# Patient Record
Sex: MALE | Race: WHITE | NOT HISPANIC OR LATINO | Employment: FULL TIME | URBAN - NONMETROPOLITAN AREA
[De-identification: names, ages, dates, MRNs, and addresses within clinical notes are randomized per-mention and may not be internally consistent; named-entity substitution may affect disease eponyms.]

---

## 2018-01-19 ENCOUNTER — NON-PROVIDER VISIT (OUTPATIENT)
Dept: URGENT CARE | Facility: PHYSICIAN GROUP | Age: 58
End: 2018-01-19

## 2018-01-19 PROCEDURE — 80305 DRUG TEST PRSMV DIR OPT OBS: CPT | Performed by: PHYSICIAN ASSISTANT

## 2018-02-09 ENCOUNTER — OFFICE VISIT (OUTPATIENT)
Dept: MEDICAL GROUP | Facility: PHYSICIAN GROUP | Age: 58
End: 2018-02-09
Payer: COMMERCIAL

## 2018-02-09 VITALS
HEIGHT: 67 IN | HEART RATE: 94 BPM | BODY MASS INDEX: 41.59 KG/M2 | WEIGHT: 265 LBS | DIASTOLIC BLOOD PRESSURE: 86 MMHG | TEMPERATURE: 98.9 F | OXYGEN SATURATION: 98 % | SYSTOLIC BLOOD PRESSURE: 134 MMHG | RESPIRATION RATE: 20 BRPM

## 2018-02-09 DIAGNOSIS — Z00.00 ENCOUNTER FOR PREVENTATIVE ADULT HEALTH CARE EXAMINATION: ICD-10-CM

## 2018-02-09 DIAGNOSIS — Z13.6 SCREENING FOR CARDIOVASCULAR CONDITION: ICD-10-CM

## 2018-02-09 DIAGNOSIS — R53.83 FATIGUE, UNSPECIFIED TYPE: ICD-10-CM

## 2018-02-09 DIAGNOSIS — Z12.11 SCREEN FOR COLON CANCER: ICD-10-CM

## 2018-02-09 DIAGNOSIS — Z13.21 ENCOUNTER FOR VITAMIN DEFICIENCY SCREENING: ICD-10-CM

## 2018-02-09 DIAGNOSIS — Z13.29 SCREENING FOR THYROID DISORDER: ICD-10-CM

## 2018-02-09 DIAGNOSIS — E78.2 MIXED HYPERLIPIDEMIA: ICD-10-CM

## 2018-02-09 PROCEDURE — 99203 OFFICE O/P NEW LOW 30 MIN: CPT | Performed by: NURSE PRACTITIONER

## 2018-02-09 ASSESSMENT — PATIENT HEALTH QUESTIONNAIRE - PHQ9: CLINICAL INTERPRETATION OF PHQ2 SCORE: 0

## 2018-02-09 NOTE — ASSESSMENT & PLAN NOTE
This is a chronic problem for patient. His last lipid profile in 2014 showed LDL at 155, triglycerides at 206. At that time patient was going to work on diet and exercise. He reports he had lost 20 pounds, but then gained it back.  He would like to get back on track and take control of his health.  I commended on wanting to get healthy.  We reviewed low cholesterol diet an education handout was given to patient.  Will get updated lipid profile.

## 2018-02-10 NOTE — PATIENT INSTRUCTIONS
Fat and Cholesterol Restricted Diet  High levels of fat and cholesterol in your blood may lead to various health problems, such as diseases of the heart, blood vessels, gallbladder, liver, and pancreas. Fats are concentrated sources of energy that come in various forms. Certain types of fat, including saturated fat, may be harmful in excess. Cholesterol is a substance needed by your body in small amounts. Your body makes all the cholesterol it needs. Excess cholesterol comes from the food you eat.  When you have high levels of cholesterol and saturated fat in your blood, health problems can develop because the excess fat and cholesterol will gather along the walls of your blood vessels, causing them to narrow. Choosing the right foods will help you control your intake of fat and cholesterol. This will help keep the levels of these substances in your blood within normal limits and reduce your risk of disease.  WHAT IS MY PLAN?  Your health care provider recommends that you:  · Get no more than __________ % of the total calories in your daily diet from fat.  · Limit your intake of saturated fat to less than ______% of your total calories each day.  · Limit the amount of cholesterol in your diet to less than _________mg per day.  WHAT TYPES OF FAT SHOULD I CHOOSE?  · Choose healthy fats more often. Choose monounsaturated and polyunsaturated fats, such as olive and canola oil, flaxseeds, walnuts, almonds, and seeds.  · Eat more omega-3 fats. Good choices include salmon, mackerel, sardines, tuna, flaxseed oil, and ground flaxseeds. Aim to eat fish at least two times a week.  · Limit saturated fats. Saturated fats are primarily found in animal products, such as meats, butter, and cream. Plant sources of saturated fats include palm oil, palm kernel oil, and coconut oil.  · Avoid foods with partially hydrogenated oils in them. These contain trans fats. Examples of foods that contain trans fats are stick margarine, some tub  "margarines, cookies, crackers, and other baked goods.  WHAT GENERAL GUIDELINES DO I NEED TO FOLLOW?  These guidelines for healthy eating will help you control your intake of fat and cholesterol:  · Check food labels carefully to identify foods with trans fats or high amounts of saturated fat.  · Fill one half of your plate with vegetables and green salads.  · Fill one fourth of your plate with whole grains. Look for the word \"whole\" as the first word in the ingredient list.  · Fill one fourth of your plate with lean protein foods.  · Limit fruit to two servings a day. Choose fruit instead of juice.  · Eat more foods that contain soluble fiber. Examples of foods that contain this type of fiber are apples, broccoli, carrots, beans, peas, and barley. Aim to get 20-30 g of fiber per day.  · Eat more home-cooked food and less restaurant, buffet, and fast food.  · Limit or avoid alcohol.  · Limit foods high in starch and sugar.  · Limit fried foods.  · Cook foods using methods other than frying. Baking, boiling, grilling, and broiling are all great options.  · Lose weight if you are overweight. Losing just 5-10% of your initial body weight can help your overall health and prevent diseases such as diabetes and heart disease.  WHAT FOODS CAN I EAT?  Grains  Whole grains, such as whole wheat or whole grain breads, crackers, cereals, and pasta. Unsweetened oatmeal, bulgur, barley, quinoa, or brown rice. Corn or whole wheat flour tortillas.  Vegetables  Fresh or frozen vegetables (raw, steamed, roasted, or grilled). Green salads.  Fruits  All fresh, canned (in natural juice), or frozen fruits.  Meat and Other Protein Products  Ground beef (85% or leaner), grass-fed beef, or beef trimmed of fat. Skinless chicken or turkey. Ground chicken or turkey. Pork trimmed of fat. All fish and seafood. Eggs. Dried beans, peas, or lentils. Unsalted nuts or seeds. Unsalted canned or dry beans.  Dairy  Low-fat dairy products, such as skim or " 1% milk, 2% or reduced-fat cheeses, low-fat ricotta or cottage cheese, or plain low-fat yogurt.  Fats and Oils  Tub margarines without trans fats. Light or reduced-fat mayonnaise and salad dressings. Avocado. Olive, canola, sesame, or safflower oils. Natural peanut or almond butter (choose ones without added sugar and oil).  The items listed above may not be a complete list of recommended foods or beverages. Contact your dietitian for more options.  WHAT FOODS ARE NOT RECOMMENDED?  Grains  White bread. White pasta. White rice. Cornbread. Bagels, pastries, and croissants. Crackers that contain trans fat.  Vegetables  White potatoes. Corn. Creamed or fried vegetables. Vegetables in a cheese sauce.  Fruits  Dried fruits. Canned fruit in light or heavy syrup. Fruit juice.  Meat and Other Protein Products  Fatty cuts of meat. Ribs, chicken wings, vo, sausage, bologna, salami, chitterlings, fatback, hot dogs, bratwurst, and packaged luncheon meats. Liver and organ meats.  Dairy  Whole or 2% milk, cream, half-and-half, and cream cheese. Whole milk cheeses. Whole-fat or sweetened yogurt. Full-fat cheeses. Nondairy creamers and whipped toppings. Processed cheese, cheese spreads, or cheese curds.  Sweets and Desserts  Corn syrup, sugars, honey, and molasses. Candy. Jam and jelly. Syrup. Sweetened cereals. Cookies, pies, cakes, donuts, muffins, and ice cream.  Fats and Oils  Butter, stick margarine, lard, shortening, ghee, or vo fat. Coconut, palm kernel, or palm oils.  Beverages  Alcohol. Sweetened drinks (such as sodas, lemonade, and fruit drinks or punches).  The items listed above may not be a complete list of foods and beverages to avoid. Contact your dietitian for more information.     This information is not intended to replace advice given to you by your health care provider. Make sure you discuss any questions you have with your health care provider.     Document Released: 12/18/2006 Document Revised: 01/08/2016  Document Reviewed: 03/18/2015  [employer] Patient Education ©2016 [employer]

## 2018-02-10 NOTE — PROGRESS NOTES
CC:  To establish care and hyperlipidemia     HISTORY OF THE PRESENT ILLNESS: Patient is a 57 y.o. male. This pleasant patient is here today to establish care and for hyperlipidemia.    Health Maintenance:  Screening colonoscopy ordered today.      Hyperlipidemia  This is a chronic problem for patient. His last lipid profile in 2014 showed LDL at 155, triglycerides at 206. At that time patient was going to work on diet and exercise. He reports he had lost 20 pounds, but then gained it back.  He would like to get back on track and take control of his health.  I commended on wanting to get healthy.  We reviewed low cholesterol diet an education handout was given to patient.  Will get updated lipid profile.      Allergies: Patient has no known allergies.    Current Outpatient Prescriptions Ordered in [facility]   Medication Sig Dispense Refill   • oxycodone-acetaminophen (PERCOCET) 5-325 MG TABS Take 1-2 Tabs by mouth every four hours as needed. Indications: Moderate to Moderately Severe Pain     • metoclopramide (REGLAN) 10 MG TABS Take 10 mg by mouth 4 times a day. 1 tablet before meals and at bedtime       No current Epic-ordered facility-administered medications on file.        History reviewed. No pertinent past medical history.    Past Surgical History:   Procedure Laterality Date   • EXPLORATORY LAPAROTOMY  1/6/2014    Performed by Elmer Bojorquez M.D. at SURGERY [facility]       Social History   Substance Use Topics   • Smoking status: Former Smoker     Packs/day: 0.50     Years: 10.00     Types: Cigarettes     Quit date: 2/4/1999   • Smokeless tobacco: Never Used   • Alcohol use 1.2 oz/week     2 Cans of beer per week      Comment: occ       Family History   Problem Relation Age of Onset   • Cancer Mother      ovarian   • Cancer Father    • Heart Disease Paternal Grandfather    • Heart Attack Paternal Grandfather    • Diabetes Neg Hx        ROS:     - Constitutional: Negative for fever, chills,  "unexpected weight change.  Positive for fatigue.     - HEENT: Negative for vision changes, hearing changes, ear pain, rhinorrhea, sinus congestion, and sore throat.  Positive for headache when mildly dehydrated.    - Respiratory: Negative for cough, dyspnea.      - Cardiovascular: Negative for chest pain, palpitations, orthopnea, and bilateral lower extremity edema.     - Gastrointestinal: Negative for heartburn, nausea, vomiting, abdominal pain, hematochezia, melena, diarrhea, constipation.     - Genitourinary: Negative for dysuria, polyuria, hematuria, pyuria, urinary urgency, and urinary incontinence.     - Skin: Negative for rash, itching, cyanotic skin color change.     - Neurological: Negative for dizziness.           Exam: Blood pressure 134/86, pulse 94, temperature 37.2 °C (98.9 °F), resp. rate 20, height 1.702 m (5' 7\"), weight 120.2 kg (265 lb), SpO2 98 %. Body mass index is 41.5 kg/m².    General: Alert, pleasant, obese habitus, well developed male in NAD  HEENT: Normocephalic. Eyes conjunctiva clear lids without ptosis, pupils equal and reactive to light, ears normal shape and contour, Right canal with large amount of dark brown cerumen, Left canal is clear, Left tympanic membranes are pearly gray with good light reflex, unable to visualize right TM, nasal mucosa without erythema and drainage, oropharynx is without erythema, edema or exudates.   Neck: Supple without bruit. Thyroid is not enlarged.  Pulmonary: Clear to ausculation.  Normal effort. No rales, ronchi, or wheezing.  Cardiovascular: Regular rate and rhythm without murmur. Carotid and radial pulses are intact and equal bilaterally. No lower extremity edmema.  Abdomen: Mildly firm, nontender, distended. Normal bowel sounds. Unable to appreciate liver and spleen due to body habitus.  Neurologic: Grossly nonfocal  Lymph: No cervical or supraclavicular lymph nodes are palpable  Skin: Warm and dry.  No obvious lesions.  Musculoskeletal: Normal " gait.  Psych: Normal mood and affect. Alert and oriented. Judgment and insight is normal.    Please note that this dictation was created using voice recognition software. I have made every reasonable attempt to correct obvious errors, but I expect that there are errors of grammar and possibly content that I did not discover before finalizing the note.      Assessment/Plan  1. Mixed hyperlipidemia  Will review lab results at follow up appointment.  - LIPID PROFILE; Future    2. Screening for cardiovascular condition  Will review lab results with patient at follow up appointment.  - COMP METABOLIC PANEL; Future  - LIPID PROFILE; Future  - ESTIMATED GFR; Future    3. Encounter for vitamin deficiency screening  Will review at appointment  - VITAMIN D,25 HYDROXY; Future    4. Screening for thyroid disorder    - TSH; Future  - FREE THYROXINE; Future    5. Fatigue, unspecified type  Will review at appointment.  - TESTOSTERONE LC-MS/MX BIO/SHBG; Future    6. Screen for colon cancer  Ordered.  - REFERRAL TO GI FOR COLONOSCOPY     Patient will return to clinic in one month to review lab results and for management of hyperlipidemia.

## 2018-02-26 ENCOUNTER — HOSPITAL ENCOUNTER (OUTPATIENT)
Dept: LAB | Facility: MEDICAL CENTER | Age: 58
End: 2018-02-26
Attending: NURSE PRACTITIONER
Payer: COMMERCIAL

## 2018-02-26 DIAGNOSIS — R53.83 FATIGUE, UNSPECIFIED TYPE: ICD-10-CM

## 2018-02-26 DIAGNOSIS — Z13.6 SCREENING FOR CARDIOVASCULAR CONDITION: ICD-10-CM

## 2018-02-26 DIAGNOSIS — Z13.21 ENCOUNTER FOR VITAMIN DEFICIENCY SCREENING: ICD-10-CM

## 2018-02-26 DIAGNOSIS — Z13.29 SCREENING FOR THYROID DISORDER: ICD-10-CM

## 2018-02-26 DIAGNOSIS — E78.2 MIXED HYPERLIPIDEMIA: ICD-10-CM

## 2018-02-26 LAB
ALBUMIN SERPL BCP-MCNC: 4.3 G/DL (ref 3.2–4.9)
ALBUMIN/GLOB SERPL: 1.4 G/DL
ALP SERPL-CCNC: 61 U/L (ref 30–99)
ALT SERPL-CCNC: 33 U/L (ref 2–50)
ANION GAP SERPL CALC-SCNC: 3 MMOL/L (ref 0–11.9)
AST SERPL-CCNC: 22 U/L (ref 12–45)
BILIRUB SERPL-MCNC: 0.4 MG/DL (ref 0.1–1.5)
BUN SERPL-MCNC: 19 MG/DL (ref 8–22)
CALCIUM SERPL-MCNC: 9 MG/DL (ref 8.5–10.5)
CHLORIDE SERPL-SCNC: 105 MMOL/L (ref 96–112)
CHOLEST SERPL-MCNC: 225 MG/DL (ref 100–199)
CO2 SERPL-SCNC: 27 MMOL/L (ref 20–33)
CREAT SERPL-MCNC: 0.96 MG/DL (ref 0.5–1.4)
GLOBULIN SER CALC-MCNC: 3 G/DL (ref 1.9–3.5)
GLUCOSE SERPL-MCNC: 152 MG/DL (ref 65–99)
HDLC SERPL-MCNC: 31 MG/DL
LDLC SERPL CALC-MCNC: 145 MG/DL
POTASSIUM SERPL-SCNC: 4.4 MMOL/L (ref 3.6–5.5)
PROT SERPL-MCNC: 7.3 G/DL (ref 6–8.2)
SODIUM SERPL-SCNC: 135 MMOL/L (ref 135–145)
TRIGL SERPL-MCNC: 245 MG/DL (ref 0–149)

## 2018-02-26 PROCEDURE — 84403 ASSAY OF TOTAL TESTOSTERONE: CPT

## 2018-02-26 PROCEDURE — 84270 ASSAY OF SEX HORMONE GLOBUL: CPT

## 2018-02-26 PROCEDURE — 80053 COMPREHEN METABOLIC PANEL: CPT

## 2018-02-26 PROCEDURE — 80061 LIPID PANEL: CPT

## 2018-02-26 PROCEDURE — 84439 ASSAY OF FREE THYROXINE: CPT

## 2018-02-26 PROCEDURE — 36415 COLL VENOUS BLD VENIPUNCTURE: CPT

## 2018-02-26 PROCEDURE — 82306 VITAMIN D 25 HYDROXY: CPT

## 2018-02-26 PROCEDURE — 84443 ASSAY THYROID STIM HORMONE: CPT

## 2018-02-27 LAB
25(OH)D3 SERPL-MCNC: 9 NG/ML (ref 30–100)
T4 FREE SERPL-MCNC: 0.71 NG/DL (ref 0.53–1.43)
TSH SERPL DL<=0.005 MIU/L-ACNC: 1.01 UIU/ML (ref 0.38–5.33)

## 2018-03-02 LAB
SHBG SERPL-SCNC: 16 NMOL/L (ref 11–80)
TESTOST FREE SERPL-MCNC: 70.5 PG/ML (ref 47–244)
TESTOST SERPL-MCNC: 279 NG/DL (ref 300–890)
TESTOSTERONE.FREE+WB SERPL-MCNC: 188.3 NG/DL (ref 130–680)

## 2018-03-09 ENCOUNTER — OFFICE VISIT (OUTPATIENT)
Dept: MEDICAL GROUP | Facility: PHYSICIAN GROUP | Age: 58
End: 2018-03-09
Payer: COMMERCIAL

## 2018-03-09 VITALS
TEMPERATURE: 99.7 F | HEIGHT: 67 IN | RESPIRATION RATE: 20 BRPM | SYSTOLIC BLOOD PRESSURE: 138 MMHG | DIASTOLIC BLOOD PRESSURE: 88 MMHG | HEART RATE: 84 BPM | BODY MASS INDEX: 41 KG/M2 | OXYGEN SATURATION: 96 % | WEIGHT: 261.2 LBS

## 2018-03-09 DIAGNOSIS — R03.0 PREHYPERTENSION: ICD-10-CM

## 2018-03-09 DIAGNOSIS — R79.89 LOW TESTOSTERONE IN MALE: ICD-10-CM

## 2018-03-09 DIAGNOSIS — Z13.6 SCREENING FOR CARDIOVASCULAR CONDITION: ICD-10-CM

## 2018-03-09 DIAGNOSIS — E78.2 MIXED HYPERLIPIDEMIA: ICD-10-CM

## 2018-03-09 DIAGNOSIS — E55.9 VITAMIN D DEFICIENCY: ICD-10-CM

## 2018-03-09 DIAGNOSIS — R73.9 ELEVATED BLOOD SUGAR: ICD-10-CM

## 2018-03-09 PROCEDURE — 99214 OFFICE O/P EST MOD 30 MIN: CPT | Performed by: NURSE PRACTITIONER

## 2018-03-09 RX ORDER — ERGOCALCIFEROL 1.25 MG/1
50000 CAPSULE ORAL
Qty: 12 CAP | Refills: 2 | Status: SHIPPED | OUTPATIENT
Start: 2018-03-09 | End: 2019-04-26

## 2018-03-09 NOTE — PATIENT INSTRUCTIONS
Fat and Cholesterol Restricted Diet  High levels of fat and cholesterol in your blood may lead to various health problems, such as diseases of the heart, blood vessels, gallbladder, liver, and pancreas. Fats are concentrated sources of energy that come in various forms. Certain types of fat, including saturated fat, may be harmful in excess. Cholesterol is a substance needed by your body in small amounts. Your body makes all the cholesterol it needs. Excess cholesterol comes from the food you eat.  When you have high levels of cholesterol and saturated fat in your blood, health problems can develop because the excess fat and cholesterol will gather along the walls of your blood vessels, causing them to narrow. Choosing the right foods will help you control your intake of fat and cholesterol. This will help keep the levels of these substances in your blood within normal limits and reduce your risk of disease.  WHAT IS MY PLAN?  Your health care provider recommends that you:  · Get no more than __________ % of the total calories in your daily diet from fat.  · Limit your intake of saturated fat to less than ______% of your total calories each day.  · Limit the amount of cholesterol in your diet to less than _________mg per day.  WHAT TYPES OF FAT SHOULD I CHOOSE?  · Choose healthy fats more often. Choose monounsaturated and polyunsaturated fats, such as olive and canola oil, flaxseeds, walnuts, almonds, and seeds.  · Eat more omega-3 fats. Good choices include salmon, mackerel, sardines, tuna, flaxseed oil, and ground flaxseeds. Aim to eat fish at least two times a week.  · Limit saturated fats. Saturated fats are primarily found in animal products, such as meats, butter, and cream. Plant sources of saturated fats include palm oil, palm kernel oil, and coconut oil.  · Avoid foods with partially hydrogenated oils in them. These contain trans fats. Examples of foods that contain trans fats are stick margarine, some tub  "margarines, cookies, crackers, and other baked goods.  WHAT GENERAL GUIDELINES DO I NEED TO FOLLOW?  These guidelines for healthy eating will help you control your intake of fat and cholesterol:  · Check food labels carefully to identify foods with trans fats or high amounts of saturated fat.  · Fill one half of your plate with vegetables and green salads.  · Fill one fourth of your plate with whole grains. Look for the word \"whole\" as the first word in the ingredient list.  · Fill one fourth of your plate with lean protein foods.  · Limit fruit to two servings a day. Choose fruit instead of juice.  · Eat more foods that contain soluble fiber. Examples of foods that contain this type of fiber are apples, broccoli, carrots, beans, peas, and barley. Aim to get 20-30 g of fiber per day.  · Eat more home-cooked food and less restaurant, buffet, and fast food.  · Limit or avoid alcohol.  · Limit foods high in starch and sugar.  · Limit fried foods.  · Cook foods using methods other than frying. Baking, boiling, grilling, and broiling are all great options.  · Lose weight if you are overweight. Losing just 5-10% of your initial body weight can help your overall health and prevent diseases such as diabetes and heart disease.  WHAT FOODS CAN I EAT?  Grains  Whole grains, such as whole wheat or whole grain breads, crackers, cereals, and pasta. Unsweetened oatmeal, bulgur, barley, quinoa, or brown rice. Corn or whole wheat flour tortillas.  Vegetables  Fresh or frozen vegetables (raw, steamed, roasted, or grilled). Green salads.  Fruits  All fresh, canned (in natural juice), or frozen fruits.  Meat and Other Protein Products  Ground beef (85% or leaner), grass-fed beef, or beef trimmed of fat. Skinless chicken or turkey. Ground chicken or turkey. Pork trimmed of fat. All fish and seafood. Eggs. Dried beans, peas, or lentils. Unsalted nuts or seeds. Unsalted canned or dry beans.  Dairy  Low-fat dairy products, such as skim or " 1% milk, 2% or reduced-fat cheeses, low-fat ricotta or cottage cheese, or plain low-fat yogurt.  Fats and Oils  Tub margarines without trans fats. Light or reduced-fat mayonnaise and salad dressings. Avocado. Olive, canola, sesame, or safflower oils. Natural peanut or almond butter (choose ones without added sugar and oil).  The items listed above may not be a complete list of recommended foods or beverages. Contact your dietitian for more options.  WHAT FOODS ARE NOT RECOMMENDED?  Grains  White bread. White pasta. White rice. Cornbread. Bagels, pastries, and croissants. Crackers that contain trans fat.  Vegetables  White potatoes. Corn. Creamed or fried vegetables. Vegetables in a cheese sauce.  Fruits  Dried fruits. Canned fruit in light or heavy syrup. Fruit juice.  Meat and Other Protein Products  Fatty cuts of meat. Ribs, chicken wings, vo, sausage, bologna, salami, chitterlings, fatback, hot dogs, bratwurst, and packaged luncheon meats. Liver and organ meats.  Dairy  Whole or 2% milk, cream, half-and-half, and cream cheese. Whole milk cheeses. Whole-fat or sweetened yogurt. Full-fat cheeses. Nondairy creamers and whipped toppings. Processed cheese, cheese spreads, or cheese curds.  Sweets and Desserts  Corn syrup, sugars, honey, and molasses. Candy. Jam and jelly. Syrup. Sweetened cereals. Cookies, pies, cakes, donuts, muffins, and ice cream.  Fats and Oils  Butter, stick margarine, lard, shortening, ghee, or vo fat. Coconut, palm kernel, or palm oils.  Beverages  Alcohol. Sweetened drinks (such as sodas, lemonade, and fruit drinks or punches).  The items listed above may not be a complete list of foods and beverages to avoid. Contact your dietitian for more information.     This information is not intended to replace advice given to you by your health care provider. Make sure you discuss any questions you have with your health care provider.     Document Released: 12/18/2006 Document Revised: 01/08/2016  "Document Reviewed: 03/18/2015  [employer] Interactive Patient Education ©2016 [employer]  DASH Eating Plan  DASH stands for \"Dietary Approaches to Stop Hypertension.\" The DASH eating plan is a healthy eating plan that has been shown to reduce high blood pressure (hypertension). Additional health benefits may include reducing the risk of type 2 diabetes mellitus, heart disease, and stroke. The DASH eating plan may also help with weight loss.  What do I need to know about the DASH eating plan?  For the DASH eating plan, you will follow these general guidelines:  · Choose foods with less than 150 milligrams of sodium per serving (as listed on the food label).  · Use salt-free seasonings or herbs instead of table salt or sea salt.  · Check with your health care provider or pharmacist before using salt substitutes.  · Eat lower-sodium products. These are often labeled as \"low-sodium\" or \"no salt added.\"  · Eat fresh foods. Avoid eating a lot of canned foods.  · Eat more vegetables, fruits, and low-fat dairy products.  · Choose whole grains. Look for the word \"whole\" as the first word in the ingredient list.  · Choose fish and skinless chicken or turkey more often than red meat. Limit fish, poultry, and meat to 6 oz (170 g) each day.  · Limit sweets, desserts, sugars, and sugary drinks.  · Choose heart-healthy fats.  · Eat more home-cooked food and less restaurant, buffet, and fast food.  · Limit fried foods.  · Do not ho foods. Cook foods using methods such as baking, boiling, grilling, and broiling instead.  · When eating at a restaurant, ask that your food be prepared with less salt, or no salt if possible.  What foods can I eat?  Seek help from a dietitian for individual calorie needs.  Grains   Whole grain or whole wheat bread. Brown rice. Whole grain or whole wheat pasta. Quinoa, bulgur, and whole grain cereals. Low-sodium cereals. Corn or whole wheat flour tortillas. Whole grain cornbread. Whole grain crackers. " Low-sodium crackers.  Vegetables   Fresh or frozen vegetables (raw, steamed, roasted, or grilled). Low-sodium or reduced-sodium tomato and vegetable juices. Low-sodium or reduced-sodium tomato sauce and paste. Low-sodium or reduced-sodium canned vegetables.  Fruits   All fresh, canned (in natural juice), or frozen fruits.  Meat and Other Protein Products   Ground beef (85% or leaner), grass-fed beef, or beef trimmed of fat. Skinless chicken or turkey. Ground chicken or turkey. Pork trimmed of fat. All fish and seafood. Eggs. Dried beans, peas, or lentils. Unsalted nuts and seeds. Unsalted canned beans.  Dairy   Low-fat dairy products, such as skim or 1% milk, 2% or reduced-fat cheeses, low-fat ricotta or cottage cheese, or plain low-fat yogurt. Low-sodium or reduced-sodium cheeses.  Fats and Oils   Tub margarines without trans fats. Light or reduced-fat mayonnaise and salad dressings (reduced sodium). Avocado. Safflower, olive, or canola oils. Natural peanut or almond butter.  Other   Unsalted popcorn and pretzels.  The items listed above may not be a complete list of recommended foods or beverages. Contact your dietitian for more options.   What foods are not recommended?  Grains   White bread. White pasta. White rice. Refined cornbread. Bagels and croissants. Crackers that contain trans fat.  Vegetables   Creamed or fried vegetables. Vegetables in a cheese sauce. Regular canned vegetables. Regular canned tomato sauce and paste. Regular tomato and vegetable juices.  Fruits   Canned fruit in light or heavy syrup. Fruit juice.  Meat and Other Protein Products   Fatty cuts of meat. Ribs, chicken wings, vo, sausage, bologna, salami, chitterlings, fatback, hot dogs, bratwurst, and packaged luncheon meats. Salted nuts and seeds. Canned beans with salt.  Dairy   Whole or 2% milk, cream, half-and-half, and cream cheese. Whole-fat or sweetened yogurt. Full-fat cheeses or blue cheese. Nondairy creamers and whipped  toppings. Processed cheese, cheese spreads, or cheese curds.  Condiments   Onion and garlic salt, seasoned salt, table salt, and sea salt. Canned and packaged gravies. Worcestershire sauce. Tartar sauce. Barbecue sauce. Teriyaki sauce. Soy sauce, including reduced sodium. Steak sauce. Fish sauce. Oyster sauce. Cocktail sauce. Horseradish. Ketchup and mustard. Meat flavorings and tenderizers. Bouillon cubes. Hot sauce. Tabasco sauce. Marinades. Taco seasonings. Relishes.  Fats and Oils   Butter, stick margarine, lard, shortening, ghee, and vo fat. Coconut, palm kernel, or palm oils. Regular salad dressings.  Other   Pickles and olives. Salted popcorn and pretzels.  The items listed above may not be a complete list of foods and beverages to avoid. Contact your dietitian for more information.   Where can I find more information?  [employer] [place]: www.nhlbi.nih.gov/health/health-topics/topics/dash/  This information is not intended to replace advice given to you by your health care provider. Make sure you discuss any questions you have with your health care provider.  Document Released: 12/06/2012 Document Revised: 05/25/2017 Document Reviewed: 10/22/2014  [employer] Interactive Patient Education © 2017 [employer]  2400 Calorie Diet for Diabetes Meal Planning  The 2400 calorie diet is designed for eating up to 2400 calories each day. Following this diet and making healthy meal choices can help improve overall health. This diet controls blood sugar (glucose) levels and can also lower blood pressure and cholesterol.   SERVING SIZES  Measuring foods and serving sizes helps to make sure you are getting the right amount of food. The list below tells how big or small some common serving sizes are.  · 1 oz.........4 stacked dice.   · 3 oz.........Deck of cards.   · 1 tsp........Tip of little finger.   · 1 tbs........Thumb.   · 2 tbs........Golf ball.   · ½ cup.......Half of a fist.   · 1  cup........A fist.   GUIDELINES FOR CHOOSING FOODS  The goal of this diet is to eat a variety of foods and limit calories to 2400 each day. This can be done by choosing foods that are low in calories and in fat. The diet also suggests eating small amounts of food often. Doing this helps control your blood glucose levels so they do not get too high or low. Each meal or snack should contain a protein food source to help you feel more satisfied and to stabilize your blood glucose. Try to eat about the same amount of food around the same time each day. This includes weekend days, travel days, and days off work. Space your meals about 4 to 5 hours apart and add a snack between them if you wish.   For example, a daily food plan could include breakfast, a morning snack, lunch, dinner, and an evening snack. Healthy meals and snacks include whole grains, vegetables, fruits, lean meats, poultry, fish, and dairy products. As you plan your meals, choose a variety of foods. Choose from the bread and starches, vegetables, fruit, dairy, and meat/protein groups. Examples of foods from each group are listed below with their suggested serving sizes. Use measuring cups and spoons to become familiar with what a healthy portion looks like.  Bread and Starch  Each serving equals 15 grams of carbohydrates.  · 1 slice bread.   · ¼ bagel.   · ¾ cup cold cereal (unsweetened).   · ½ cup hot cereal or mashed potatoes.   · 1 small potato (size of a computer mouse).   ·  cup cooked pasta or rice.   · ½ English muffin.   · 1 cup broth-based soup.   · 3 cups of popcorn.   · 4 to 6 whole-wheat crackers.   · ½ cup cooked beans, peas, or corn.   Vegetable  Each serving equals 5 grams of carbohydrates.  · ½ cup cooked vegetables.   · 1 cup raw vegetables.   · ½ cup tomato or vegetable juice.   Fruit  Each serving equals 15 grams of carbohydrates.  · 1 small apple or orange.   · 1¼ cup watermelon or strawberries.   · ½ cup applesauce (no sugar added).    · 2 tbs raisins.   · ½ banana.   · ½ cup canned fruit, packed in water, in its own juice, or sweetened with a sugar substitute.   · ½ cup unsweetened fruit juice.   Dairy  Each serving equals 12 to 15 grams of carbohydrates.  · 1 cup fat-free milk.   · 6 oz artificially sweetened yogurt or plain yogurt.   · 1 cup low-fat buttermilk.   · 1 cup soy milk.   Meat/Protein  · 1 large egg.   · 2 to 3 oz meat, poultry, or fish.   · ½ cup low-fat cottage cheese.   · 1 tbs peanut butter.   · ½ cup tofu.   · 1 oz low-fat cheese.   · ¼ cup canned tuna in water.   Fat  · 1 tsp oil.   · 1 tsp trans-fat-free margarine.   · 1 tsp butter.   · 1 tsp mayonnaise.   · 2 tbs avocado.   SAMPLE 2400 CALORIE DIET PLAN  Breakfast  · 1 English muffin (2 carb servings).   · 1 scrambled egg.   · 1 tsp margarine.   · 1 cup fat-free milk (1 carb serving).   · 1 large orange (2 carb servings).   Morning Snack  · ¼ cup low-fat cottage cheese.   · ½ cup canned peaches in juice (1 carb serving).   · 1 cup carrot sticks.   Lunch  · Grilled chicken salad.   · 2 oz chicken breast.   · 1 cup bert lettuce or spinach.   · ½ cup diced tomato.   · ½ cup shredded carrots.   · ¼ cup sliced cucumbers.   · 2 tbs low-fat salad dressing.   · 2 slices whole-wheat bread (2 carb servings).   · 1 small apple (1 carb serving).   · 1 cup fat-free milk (1 carb serving).   · 15 baked chips ( 1 carb serving).   Afternoon Snack  · 8 reduced fat crackers (2 carb servings).   · 2 tbs peanut butter.   Dinner  · 3 oz salmon, broiled.   · 4½ small red potatoes, roasted with 1 tsp olive oil and seasoning (3 carb servings).   · 1 cup green beans.   · 1¼ cup strawberries (1 carb serving).   · 1 cup fat-free milk (1 carb serving).   Evening Snack  · 6 cups air-popped popcorn (2 carb servings).   · 2 tbs parmesan cheese sprinkled on top.   · 8 almonds.   MEAL PLAN  Use this worksheet to help you make a daily meal plan based on the 2400 calorie diet suggestions. The total amount  of carbohydrates in your meal or snack is more important than making sure you include all of the food groups at every meal or snack. If you are using this plan to help you control your blood glucose, you may interchange carbohydrate-containing foods (dairy, starches, and fruits). Choose a variety of fresh foods of varying colors and flavors. You can choose from the following foods to build your day's meals:  · 12 Starches.   · 4 Vegetables.   · 4 Fruits.   · 3 Dairy.   · 7 oz Meat/Protein.   · Up to 8 Fats.   Your dietician can use this worksheet to help you decide how many servings and what types of foods are right for you.  BREAKFAST  Food Group and Servings / Food Choice  Starch ____________________________________________________  Dairy _____________________________________________________  Fruit _____________________________________________________  Meat/Protein ______________________________________________  Fat________________________________________________________  LUNCH  Food Group and Servings / Food Choice   Starch ___________________________________________________  Meat/Protein _____________________________________________  Vegetable ________________________________________________  Fruit _____________________________________________________  Dairy ____________________________________________________  Fat_______________________________________________________  AFTERNOON SNACK  Food Group and Servings / Food Choice  Starch ___________________________________________________  Meat/Protein _____________________________________________  DINNER  Food Group and Servings / Food Choice  Starch ___________________________________________________  Meat/Protein _____________________________________________  Dairy ____________________________________________________  Vegetable ________________________________________________  Fruit  _____________________________________________________  Fat_______________________________________________________  EVENING SNACK  Food Group and Servings / Food Choice  Fruit _____________________________________________________  Meat/Protein ______________________________________________  Starch ____________________________________________________  DAILY TOTALS  Starch __________________________  Vegetable _______________________  Fruits ___________________________  Dairy ___________________________  Meat/Protein ____________________  Fat _____________________________  Document Released: 07/10/2006 Document Revised: 03/11/2013 Document Reviewed: 11/02/2012  [employer]® Patient Information ©2013 [facility].    Diabetes Mellitus and Food  It is important for you to manage your blood sugar (glucose) level. Your blood glucose level can be greatly affected by what you eat. Eating healthier foods in the appropriate amounts throughout the day at about the same time each day will help you control your blood glucose level. It can also help slow or prevent worsening of your diabetes mellitus. Healthy eating may even help you improve the level of your blood pressure and reach or maintain a healthy weight.  General recommendations for healthful eating and cooking habits include:  · Eating meals and snacks regularly. Avoid going long periods of time without eating to lose weight.  · Eating a diet that consists mainly of plant-based foods, such as fruits, vegetables, nuts, legumes, and whole grains.  · Using low-heat cooking methods, such as baking, instead of high-heat cooking methods, such as deep frying.  Work with your dietitian to make sure you understand how to use the Nutrition Facts information on food labels.  How can food affect me?  Carbohydrates   Carbohydrates affect your blood glucose level more than any other type of food. Your dietitian will help you determine how many carbohydrates to eat at each meal and teach  you how to count carbohydrates. Counting carbohydrates is important to keep your blood glucose at a healthy level, especially if you are using insulin or taking certain medicines for diabetes mellitus.  Alcohol   Alcohol can cause sudden decreases in blood glucose (hypoglycemia), especially if you use insulin or take certain medicines for diabetes mellitus. Hypoglycemia can be a life-threatening condition. Symptoms of hypoglycemia (sleepiness, dizziness, and disorientation) are similar to symptoms of having too much alcohol.  If your health care provider has given you approval to drink alcohol, do so in moderation and use the following guidelines:  · Women should not have more than one drink per day, and men should not have more than two drinks per day. One drink is equal to:  ¨ 12 oz of beer.  ¨ 5 oz of wine.  ¨ 1½ oz of hard liquor.  · Do not drink on an empty stomach.  · Keep yourself hydrated. Have water, diet soda, or unsweetened iced tea.  · Regular soda, juice, and other mixers might contain a lot of carbohydrates and should be counted.  What foods are not recommended?  As you make food choices, it is important to remember that all foods are not the same. Some foods have fewer nutrients per serving than other foods, even though they might have the same number of calories or carbohydrates. It is difficult to get your body what it needs when you eat foods with fewer nutrients. Examples of foods that you should avoid that are high in calories and carbohydrates but low in nutrients include:  · Trans fats (most processed foods list trans fats on the Nutrition Facts label).  · Regular soda.  · Juice.  · Candy.  · Sweets, such as cake, pie, doughnuts, and cookies.  · Fried foods.  What foods can I eat?  Eat nutrient-rich foods, which will nourish your body and keep you healthy. The food you should eat also will depend on several factors, including:  · The calories you need.  · The medicines you take.  · Your  weight.  · Your blood glucose level.  · Your blood pressure level.  · Your cholesterol level.  You should eat a variety of foods, including:  · Protein.  ¨ Lean cuts of meat.  ¨ Proteins low in saturated fats, such as fish, egg whites, and beans. Avoid processed meats.  · Fruits and vegetables.  ¨ Fruits and vegetables that may help control blood glucose levels, such as apples, mangoes, and yams.  · Dairy products.  ¨ Choose fat-free or low-fat dairy products, such as milk, yogurt, and cheese.  · Grains, bread, pasta, and rice.  ¨ Choose whole grain products, such as multigrain bread, whole oats, and brown rice. These foods may help control blood pressure.  · Fats.  ¨ Foods containing healthful fats, such as nuts, avocado, olive oil, canola oil, and fish.  Does everyone with diabetes mellitus have the same meal plan?  Because every person with diabetes mellitus is different, there is not one meal plan that works for everyone. It is very important that you meet with a dietitian who will help you create a meal plan that is just right for you.  This information is not intended to replace advice given to you by your health care provider. Make sure you discuss any questions you have with your health care provider.  Document Released: 09/14/2006 Document Revised: 05/25/2017 Document Reviewed: 11/14/2014  [employer] Interactive Patient Education © 2017 [employer]

## 2018-03-10 NOTE — PROGRESS NOTES
CC: Follow-up on labs for hyperlipidemia, vitamin D deficiency    HISTORY OF THE PRESENT ILLNESS: Patient is a 57 y.o. male. This pleasant patient is here today to review lab work results for hyperlipidemia, elevated blood sugar, vitamin D deficiency.    Health Maintenance: Patient reports he has not scheduled his colonoscopy yet because of his busy work schedule.      Hyperlipidemia  This is a chronic problem for patient that is not controlled with current lifestyle measures. Total cholesterol is 225, triglycerides 245, HDL 31, . We discussed lifestyle modification and medications. Patient would like to work on low-cholesterol diet, exercise, and weight loss. A handout on low-cholesterol diet was reviewed and given to patient. His fasting blood glucose is 152, and I did let him know if his blood sugar comes down his triglycerides will likely follow. We'll recheck labs in 6 months to year.The patient denies chest pain, shortness of breath or dyspnea on exertion.    Elevated blood sugar  This is new onset for patient. His fasting blood glucose is 152. He denies excessive thirst or urination, changes in vision. He does note some tingling on his scalp intermittently. We discussed for diagnosis of diabetes that we need a second fasting blood glucose. We did discuss modifiable risk factors such as a low carbohydrate diet, weight loss, and exercise. A handout regarding a diabetic diet was given to patient. He will return to clinic in one month after having a repeat CMP.    Vitamin D deficiency  This is new onset for patient. His serum vitamin D level is 9. We discussed the importance of vitamin D and calcium absorption and possibly energy level. Patient will take vitamin D 50,000 units once a week. We will recheck vitamin D level in 6 months to a year.    Low testosterone in male  This is new onset for patient. His total testosterone is mildly low at 279. We had measured this because he had noted some fatigue at  "last visit. He would like to work on his blood sugar, weight loss, exercise, hyperlipidemia to see if he feels more energetic. He denies depression. He would like to recheck this in about 6 months to a year.    Prehypertension  This is a new onset for patient. He does not smoke. The patient denies chest pain, shortness of breath, headache, sudden vision changes, nosebleeds, or dyspnea on exertion. A patient education handout on DASH diet was reviewed and given to patient.      Allergies: Patient has no known allergies.    Current Outpatient Prescriptions Ordered in [facility]   Medication Sig Dispense Refill   • vitamin D, Ergocalciferol, (DRISDOL) 61609 units Cap capsule Take 1 Cap by mouth every 7 days. 12 Cap 2     No current Epic-ordered facility-administered medications on file.        No past medical history on file.    Past Surgical History:   Procedure Laterality Date   • EXPLORATORY LAPAROTOMY  1/6/2014    Performed by Elmer Bojorquez M.D. at SURGERY [facility]       Social History   Substance Use Topics   • Smoking status: Former Smoker     Packs/day: 0.50     Years: 10.00     Types: Cigarettes     Quit date: 2/4/1999   • Smokeless tobacco: Never Used   • Alcohol use 1.2 oz/week     2 Cans of beer per week      Comment: occ       Family History   Problem Relation Age of Onset   • Cancer Mother      ovarian   • Cancer Father    • Heart Disease Paternal Grandfather    • Heart Attack Paternal Grandfather    • Diabetes Neg Hx        ROS:   As in HPI         Exam: Blood pressure 138/88, pulse 84, temperature 37.6 °C (99.7 °F), resp. rate 20, height 1.702 m (5' 7\"), weight 118.5 kg (261 lb 3.2 oz), SpO2 96 %. Body mass index is 40.91 kg/m².    General: Delightful, alert, obese habitus, well developed male in NAD  Neck: Supple without bruit. Thyroid is not enlarged.  Pulmonary: Clear to ausculation.  Normal effort. No rales, ronchi, or wheezing.  Cardiovascular: Normal rate and rhythm without murmur. Carotid " and radial pulses are intact and equal bilaterally.  No lower extremity edema.  Psych: Normal mood and affect. Alert and oriented. Judgment and insight is normal.    Please note that this dictation was created using voice recognition software. I have made every reasonable attempt to correct obvious errors, but I expect that there are errors of grammar and possibly content that I did not discover before finalizing the note.      Assessment/Plan  1. Vitamin D deficiency  Patient will take vitamin D weekly  - vitamin D, Ergocalciferol, (DRISDOL) 00850 units Cap capsule; Take 1 Cap by mouth every 7 days.  Dispense: 12 Cap; Refill: 2    2. Elevated blood sugar  Patient will get blood draw done shortly before asked appointment. We'll see if fasting blood glucose is in diabetic range. We did discuss possibility of having to start metformin at next appointment.  - COMP METABOLIC PANEL; Future    3. Mixed hyperlipidemia  Patient will work on diet and exercise and weight loss.    5. Low testosterone in male  Continue to monitor    6. Prehypertension  Patient will work on DASH diet, exercise, and weight loss.    Patient will follow up in clinic in one month for pre-hypertension and elevated blood glucose.

## 2018-03-10 NOTE — ASSESSMENT & PLAN NOTE
This is a new onset for patient. He does not smoke. The patient denies chest pain, shortness of breath, headache, sudden vision changes, nosebleeds, or dyspnea on exertion. A patient education handout on DASH diet was reviewed and given to patient.

## 2018-03-10 NOTE — ASSESSMENT & PLAN NOTE
This is new onset for patient. His serum vitamin D level is 9. We discussed the importance of vitamin D and calcium absorption and possibly energy level. Patient will take vitamin D 50,000 units once a week. We will recheck vitamin D level in 6 months to a year.

## 2018-03-10 NOTE — ASSESSMENT & PLAN NOTE
This is new onset for patient. His fasting blood glucose is 152. He denies excessive thirst or urination, changes in vision. He does note some tingling on his scalp intermittently. We discussed for diagnosis of diabetes that we need a second fasting blood glucose. We did discuss modifiable risk factors such as a low carbohydrate diet, weight loss, and exercise. A handout regarding a diabetic diet was given to patient. He will return to clinic in one month after having a repeat CMP.

## 2018-03-10 NOTE — ASSESSMENT & PLAN NOTE
This is new onset for patient. His total testosterone is mildly low at 279. We had measured this because he had noted some fatigue at last visit. He would like to work on his blood sugar, weight loss, exercise, hyperlipidemia to see if he feels more energetic. He denies depression. He would like to recheck this in about 6 months to a year.

## 2018-03-10 NOTE — ASSESSMENT & PLAN NOTE
This is a chronic problem for patient that is not controlled with current lifestyle measures. Total cholesterol is 225, triglycerides 245, HDL 31, . We discussed lifestyle modification and medications. Patient would like to work on low-cholesterol diet, exercise, and weight loss. A handout on low-cholesterol diet was reviewed and given to patient. His fasting blood glucose is 152, and I did let him know if his blood sugar comes down his triglycerides will likely follow. We'll recheck labs in 6 months to year.The patient denies chest pain, shortness of breath or dyspnea on exertion.

## 2018-10-13 ENCOUNTER — OFFICE VISIT (OUTPATIENT)
Dept: URGENT CARE | Facility: PHYSICIAN GROUP | Age: 58
End: 2018-10-13
Payer: COMMERCIAL

## 2018-10-13 VITALS
TEMPERATURE: 99.3 F | OXYGEN SATURATION: 97 % | SYSTOLIC BLOOD PRESSURE: 130 MMHG | DIASTOLIC BLOOD PRESSURE: 80 MMHG | HEART RATE: 90 BPM | WEIGHT: 252 LBS | RESPIRATION RATE: 16 BRPM | HEIGHT: 67 IN | BODY MASS INDEX: 39.55 KG/M2

## 2018-10-13 DIAGNOSIS — J98.8 RTI (RESPIRATORY TRACT INFECTION): ICD-10-CM

## 2018-10-13 DIAGNOSIS — H10.021 PINK EYE DISEASE OF RIGHT EYE: ICD-10-CM

## 2018-10-13 DIAGNOSIS — J02.9 PHARYNGITIS, UNSPECIFIED ETIOLOGY: ICD-10-CM

## 2018-10-13 PROCEDURE — 99214 OFFICE O/P EST MOD 30 MIN: CPT | Performed by: FAMILY MEDICINE

## 2018-10-13 RX ORDER — CODEINE PHOSPHATE AND GUAIFENESIN 10; 100 MG/5ML; MG/5ML
5 SOLUTION ORAL EVERY 6 HOURS PRN
Qty: 200 ML | Refills: 0 | Status: SHIPPED | OUTPATIENT
Start: 2018-10-13 | End: 2018-10-13 | Stop reason: SDUPTHER

## 2018-10-13 RX ORDER — CODEINE PHOSPHATE AND GUAIFENESIN 10; 100 MG/5ML; MG/5ML
5 SOLUTION ORAL EVERY 6 HOURS PRN
Qty: 200 ML | Refills: 0 | Status: SHIPPED | OUTPATIENT
Start: 2018-10-13 | End: 2018-10-23

## 2018-10-13 RX ORDER — AMOXICILLIN 875 MG/1
875 TABLET, COATED ORAL EVERY 12 HOURS
Qty: 20 TAB | Refills: 0 | Status: SHIPPED | OUTPATIENT
Start: 2018-10-13 | End: 2018-10-23

## 2018-10-13 RX ORDER — NEOMYCIN POLYMYXIN B SULFATES AND DEXAMETHASONE 3.5; 10000; 1 MG/ML; [USP'U]/ML; MG/ML
2 SUSPENSION/ DROPS OPHTHALMIC 3 TIMES DAILY
Qty: 1 BOTTLE | Refills: 0 | Status: SHIPPED | OUTPATIENT
Start: 2018-10-13 | End: 2018-10-18

## 2018-10-13 ASSESSMENT — ENCOUNTER SYMPTOMS
CHILLS: 0
DIZZINESS: 0
SORE THROAT: 1
EYE DISCHARGE: 1
COUGH: 1
FEVER: 0
EYE REDNESS: 1

## 2018-10-13 NOTE — PROGRESS NOTES
"Subjective:      Driss Johnson is a 57 y.o. male who presents with Conjunctivitis (possible pink eye, Cold Sx)    - This is a very pleasant, well and non-toxic appearing 57 y.o. male with complaints of Rt eye red crusty draining x 3-4 days, no trauma, conatcs or vision change    Some cough and sore throat as well x 3 days, no NVFC/cp./sob           ALLERGIES:  Patient has no known allergies.     PMH:  History reviewed. No pertinent past medical history.     MEDS:    Current Outpatient Prescriptions:   •  amoxicillin (AMOXIL) 875 MG tablet, Take 1 Tab by mouth every 12 hours for 10 days., Disp: 20 Tab, Rfl: 0  •  guaifenesin-codeine (ROBITUSSIN AC) Solution oral solution, Take 5 mL by mouth every 6 hours as needed for Cough for up to 10 days., Disp: 200 mL, Rfl: 0  •  neomycin-polymyxin-dexamethasone (MAXITROL) 0.1 % ophthalmic suspension, Place 2 Drops in right eye 3 times a day for 5 days., Disp: 1 Bottle, Rfl: 0  •  vitamin D, Ergocalciferol, (DRISDOL) 35810 units Cap capsule, Take 1 Cap by mouth every 7 days., Disp: 12 Cap, Rfl: 2    ** I have documented what I find to be significant in regards to past medical, social, family and surgical history  in my HPI or under PMH/PSH/FH review section, otherwise it is contributory **             HPI    Review of Systems   Constitutional: Negative for chills and fever.   HENT: Positive for congestion and sore throat.    Eyes: Positive for discharge and redness.   Respiratory: Positive for cough.    Neurological: Negative for dizziness.   All other systems reviewed and are negative.         Objective:     /80   Pulse 90   Temp 37.4 °C (99.3 °F)   Resp 16   Ht 1.702 m (5' 7\")   Wt 114.3 kg (252 lb)   SpO2 97%   BMI 39.47 kg/m²      Physical Exam   Constitutional: He appears well-developed. No distress.   HENT:   Head: Normocephalic and atraumatic.   Mouth/Throat: Oropharynx is clear and moist.   Neck: Neck supple.   Cardiovascular: Regular rhythm.    No murmur " heard.  Pulmonary/Chest: Effort normal and breath sounds normal. No respiratory distress.   Neurological: He is alert. He exhibits normal muscle tone.   Skin: Skin is warm and dry.   Psychiatric: He has a normal mood and affect. Judgment normal.   Nursing note and vitals reviewed.  Rt eye: injected w/ clear DC and yellow lid crusting. No gross fb/abrasions cornea.     + pharyngeal erythema             Assessment/Plan:         1. RTI (respiratory tract infection)  guaifenesin-codeine (ROBITUSSIN AC) Solution oral solution   2. Pink eye disease of right eye  neomycin-polymyxin-dexamethasone (MAXITROL) 0.1 % ophthalmic suspension   3. Pharyngitis, unspecified etiology  amoxicillin (AMOXIL) 875 MG tablet             Dx & d/c instructions discussed w/ patient and/or family members.     ER precautions (worsening signs symptoms and when to go to ER) discussed.    Follow up w/ PCP in 2-3 days to make sure symptoms improving and no further intervention/treatment and/or work-up needed was advised, ER if feeling worse or not improving in 2 days.    Possible side effects (i.e. Rash, GI upset/constipation, sedation, elevation of BP or sugars) of any medications given discussed.     Patient left in stable condition

## 2018-12-26 ENCOUNTER — OCCUPATIONAL MEDICINE (OUTPATIENT)
Dept: URGENT CARE | Facility: PHYSICIAN GROUP | Age: 58
End: 2018-12-26
Payer: COMMERCIAL

## 2018-12-26 ENCOUNTER — APPOINTMENT (OUTPATIENT)
Dept: RADIOLOGY | Facility: IMAGING CENTER | Age: 58
End: 2018-12-26
Attending: PHYSICIAN ASSISTANT
Payer: COMMERCIAL

## 2018-12-26 VITALS
OXYGEN SATURATION: 98 % | HEART RATE: 86 BPM | WEIGHT: 261 LBS | SYSTOLIC BLOOD PRESSURE: 152 MMHG | BODY MASS INDEX: 40.88 KG/M2 | TEMPERATURE: 97.8 F | DIASTOLIC BLOOD PRESSURE: 98 MMHG | RESPIRATION RATE: 16 BRPM

## 2018-12-26 DIAGNOSIS — M25.521 RIGHT ELBOW PAIN: ICD-10-CM

## 2018-12-26 DIAGNOSIS — Z02.1 PRE-EMPLOYMENT DRUG SCREENING: ICD-10-CM

## 2018-12-26 DIAGNOSIS — S50.01XA CONTUSION OF RIGHT ELBOW, INITIAL ENCOUNTER: ICD-10-CM

## 2018-12-26 LAB
BREATH ALCOHOL COMMENT: NORMAL
POC BREATHALIZER: 0 PERCENT (ref 0–0.01)

## 2018-12-26 PROCEDURE — 99214 OFFICE O/P EST MOD 30 MIN: CPT | Mod: 29 | Performed by: PHYSICIAN ASSISTANT

## 2018-12-26 PROCEDURE — 73080 X-RAY EXAM OF ELBOW: CPT | Mod: 26,RT,29 | Performed by: PHYSICIAN ASSISTANT

## 2018-12-26 PROCEDURE — 82075 ASSAY OF BREATH ETHANOL: CPT | Mod: 29 | Performed by: PHYSICIAN ASSISTANT

## 2018-12-26 PROCEDURE — 80305 DRUG TEST PRSMV DIR OPT OBS: CPT | Mod: 29 | Performed by: PHYSICIAN ASSISTANT

## 2018-12-26 ASSESSMENT — ENCOUNTER SYMPTOMS
SENSORY CHANGE: 0
FALLS: 1
TINGLING: 0

## 2018-12-26 NOTE — PROGRESS NOTES
"Subjective:   Driss Johnson is a 58 y.o. male who presents for Arm Injury ( new fell on (R) arm elbow area)    Patient presents with:  Arm Injury:  new fell on (R) arm elbow area      Date of Injury:  12/26/2018  Visit #1:  Mechanism of Injury:  Slipped on a patch of ice and fell on my right side, my right forearm and elbow absorbing most of imact.   Walking From office to truck. Elbow directly hit frozen ground. States that the elbow \"feels different.\" Pain 5/10. Nothing increases his pain. He has not taken any medications and doesn't feel that he needs pain medication.  Denies loss of strength, decreased ROM, numbness and tingling in distal extremities, hand pain, wrist pain, other injury during fall.    Denies prior injury to right elbow, hx of osteopenia or osteoporosis.    Review of Systems   Musculoskeletal: Positive for falls and joint pain.   Neurological: Negative for tingling and sensory change.      Employer's Name:       Patient presents with:  Arm Injury:  new fell on (R) arm elbow area      Date of Injury:  12/26/2018  Visit #1:  Mechanism of Injury:  Slipped on a patch of ice and fell on my right side, my right forearm and elbow absorbing most of imact.   Walking From office to truck. Elbow directly hit frozen ground. States that the elbow \"feels different.\" Pain 5/10. Nothing increases his pain. He has not taken any medications and doesn't feel that he needs pain medication.  Denies loss of strength, decreased ROM, numbness and tingling in distal extremities, hand pain, wrist pain, other injury during fall.    Denies prior injury to right elbow, hx of osteopenia or osteoporosis.                            Review of Systems   Musculoskeletal: Positive for falls and joint pain.   Neurological: Negative for tingling and sensory change.       PMH:  has no past medical history of ASTHMA; Diabetes; Heart attack ([facility]); Heart murmur; Seizure ([facility]); or Stroke ([facility]).  MEDS:   Current Outpatient " Prescriptions:   •  vitamin D, Ergocalciferol, (DRISDOL) 70552 units Cap capsule, Take 1 Cap by mouth every 7 days. (Patient not taking: Reported on 12/26/2018), Disp: 12 Cap, Rfl: 2  ALLERGIES: No Known Allergies  SURGHX:   Past Surgical History:   Procedure Laterality Date   • EXPLORATORY LAPAROTOMY  1/6/2014    Performed by Elmer Bojorquez M.D. at [facility]     SOCHX:  reports that he quit smoking about 19 years ago. His smoking use included Cigarettes. He has a 5.00 pack-year smoking history. He has never used smokeless tobacco. He reports that he drinks about 1.2 oz of alcohol per week . He reports that he does not use drugs.  FH: Family history was reviewed, no pertinent findings to report   Objective:   /98   Pulse 86   Temp 36.6 °C (97.8 °F)   Resp 16   Wt 118.4 kg (261 lb)   SpO2 98%   BMI 40.88 kg/m²   Physical Exam   Constitutional: He appears well-developed and well-nourished.   HENT:   Head: Normocephalic and atraumatic.   Neck: Neck supple.   Cardiovascular:   Pulses:       Radial pulses are 2+ on the right side, and 2+ on the left side.   Pulmonary/Chest: Effort normal. No respiratory distress.   Musculoskeletal:        Right elbow: He exhibits swelling. He exhibits normal range of motion, no effusion and no deformity. Tenderness found. Olecranon process tenderness noted. No radial head, no medial epicondyle and no lateral epicondyle tenderness noted.   Medial aspect of olecranon process is tender to palpation. There is erythema and mild soft tissue edema over right elbow.  4 cm superficial linear abrasion.    Pronation and supination WNL and action does not cause pain.    UE strength 5/5, bilaterally.       Neurological: He is alert. No sensory deficit.   Pt neurovascularly intact, distally.   Skin: Skin is warm and dry.   Psychiatric: He has a normal mood and affect. His behavior is normal. Thought content normal.   Vitals reviewed.    Physical Exam   Constitutional:  He appears well-developed and well-nourished.   HENT:   Head: Normocephalic and atraumatic.   Neck: Neck supple.   Cardiovascular:   Pulses:       Radial pulses are 2+ on the right side, and 2+ on the left side.   Pulmonary/Chest: Effort normal. No respiratory distress.   Musculoskeletal:        Right elbow: He exhibits swelling. He exhibits normal range of motion, no effusion and no deformity. Tenderness found. Olecranon process tenderness noted. No radial head, no medial epicondyle and no lateral epicondyle tenderness noted.   Medial aspect of olecranon process is tender to palpation. There is erythema and mild soft tissue edema over right elbow.  4 cm superficial linear abrasion.    Pronation and supination WNL and action does not cause pain.    UE strength 5/5, bilaterally.       Neurological: He is alert. No sensory deficit.   Pt neurovascularly intact, distally.   Skin: Skin is warm and dry.   Psychiatric: He has a normal mood and affect. His behavior is normal. Thought content normal.   Vitals reviewed.       Assessment/Plan:   1. Contusion of right elbow, initial encounter    2. Right elbow pain  - DX-ELBOW-COMPLETE 3+ RIGHT; Future      XR: No fracture or dislocation by my read.   Radiology review:  FINDINGS:  There is no focal soft tissue swelling.    There is no evidence of a joint effusion.    There is no evidence of displaced fracture or dislocation.    There is degenerative spurring along the posterior aspect of the olecranon.        No work restrictions. Rest when not at work, ice, OTC NSAIDs, reevaluation in 4 days.    Differential diagnosis, natural history, supportive care, and indications for immediate follow-up discussed.

## 2018-12-26 NOTE — LETTER
"   [facility] [facility] [place] [address]  Phone:  [phone] - Fax:  [phone]   [facility] Progress Report and Disability Certification  Date of Service: 12/26/2018   No Show:  No  Date / Time of Next Visit:     Claim Information   Patient Name: Driss Johnson  Claim Number:     Employer:   [employer] Date of Injury: 12/26/2018     Insurer / TPA: [employer] ID / SSN:     Occupation: Truck dirver   Diagnosis: Diagnoses of Contusion of right elbow, initial encounter and Right elbow pain were pertinent to this visit.    Medical Information   Related to Industrial Injury? Yes    Subjective Complaints:  Pt works for [employer]    Patient presents with:  Arm Injury: WC new fell on (R) arm elbow area      Date of Injury:  12/26/2018  Visit #1:  Mechanism of Injury:  Slipped on a patch of ice and fell on my right side, my right forearm and elbow absorbing most of imact.   Walking From office to truck. Elbow directly hit frozen ground. States that the elbow \"feels different.\" Pain 5/10. Nothing increases his pain. He has not taken any medications and doesn't feel that he needs pain medication.  Denies loss of strength, decreased ROM, numbness and tingling in distal extremities, hand pain, wrist pain, other injury during fall.    Denies prior injury to right elbow, hx of osteopenia or osteoporosis.    Review of Systems   Musculoskeletal: Positive for falls and joint pain.   Neurological: Negative for tingling and sensory change.      Objective Findings: Physical Exam   Constitutional: He appears well-developed and well-nourished.   HENT:   Head: Normocephalic and atraumatic.   Neck: Neck supple.   Cardiovascular:   Pulses:       Radial pulses are 2+ on the right side, and 2+ on the left side.   Pulmonary/Chest: Effort normal. No respiratory distress.   Musculoskeletal:        Right elbow: He exhibits swelling. He exhibits normal range of motion, no effusion and no " deformity. Tenderness found. Olecranon process tenderness noted. No radial head, no medial epicondyle and no lateral epicondyle tenderness noted.   Medial aspect of olecranon process is tender to palpation. There is erythema and mild soft tissue edema over right elbow.  4 cm superficial linear abrasion.    Pronation and supination WNL and action does not cause pain.    UE strength 5/5, bilaterally.       Neurological: He is alert. No sensory deficit.   Pt neurovascularly intact, distally.   Skin: Skin is warm and dry.   Psychiatric: He has a normal mood and affect. His behavior is normal. Thought content normal.   Vitals reviewed.       Pre-Existing Condition(s): None   Assessment:   Initial Visit    Status: Additional Care Required  Permanent Disability:No    Plan: Diagnostics    Diagnostics: X-ray  Comments:FINDINGS: No soft tissue swelling, no joint effusion, no fracture or dislocation.  There is degenerative spurring along the posterior aspect of olecranon.    Comments:  Rest, ice, OTC NSAIDs, reevaluation in 4 days.    Disability Information   Status: Released to Full Duty    From:     Through:   Restrictions are:     Physical Restrictions   Sitting:    Standing:    Stooping:    Bending:      Squatting:    Walking:    Climbing:    Pushing:      Pulling:    Other:    Reaching Above Shoulder (L):   Reaching Above Shoulder (R):       Reaching Below Shoulder (L):    Reaching Below Shoulder (R):      Not to exceed Weight Limits   Carrying(hrs):   Weight Limit(lb):   Lifting(hrs):   Weight  Limit(lb):     Comments:      Repetitive Actions   Hands: i.e. Fine Manipulations from Grasping:     Feet: i.e. Operating Foot Controls:     Driving / Operate Machinery:     Physician Name: Martin Jay P.A.-C. Physician Signature:   e-Signature: Dr. Tex Blackwood, Medical Director   Clinic Name / Location: [facility] [address] [address] Clinic Phone Number: Dept: [phone]      Appointment Time: 10:20 Am Visit Start Time: 10:50 AM   Check-In Time:  10:29 Am Visit Discharge Time:  12:22pm   Original-Treating Physician or Chiropractor    Page 2-Insurer/TPA    Page 3-Employer    Page 4-Employee

## 2018-12-26 NOTE — LETTER
EMPLOYEE’S CLAIM FOR COMPENSATION/ REPORT OF INITIAL TREATMENT  FORM C-4    EMPLOYEE’S CLAIM - PROVIDE ALL INFORMATION REQUESTED   First Name  Driss Last Name  Johnson Birthdate                    1960                Sex  male Claim Number   Home Address  [address] Age  58 y.o. 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 [place], [place] ]   What were you doing at the time of accident? (if applicable)  Walking From office to trunck     How did this injury or occupational disease occur? (Be specific an answer in detail. Use additional sheet if necessary)  Slipped on a patch of ice and fell on my right side, my right forearm and elbow absorbing most of imact.    If you believe that you have an occupational disease, when did you first have knowledge of the disability and it relationship to your employment?  N/A Witnesses to the Accident  Donna       Nature of Injury or Occupational Disease  Workers' Compensation  Part(s) of Body Injured or Affected  Lower Arm (R), Elbow (R),     I certify that the above is true and correct to the best of my knowledge and that I have provided  this information in order to obtain the benefits of [place]  I hereby authorize any physician, chiropractor, surgeon, practitioner, or other person, any hospital, including [facility] or [facility] hospital, any medical service organization, any insurance company, or other institution or organization to release to each other, any medical or other information, including benefits paid or payable, pertinent to this injury or disease, except information relative to diagnosis, treatment and/or counseling for AIDS, psychological conditions, alcohol or controlled substances, for which I must give specific authorization.  A Photostat of this authorization shall be as valid as the original.     Date   Place   Employee’s Signature   THIS REPORT MUST BE COMPLETED AND MAILED WITHIN 3 WORKING DAYS OF TREATMENT   Place  [facility]  Name of Facility  [facility]   Date  12/26/2018 Diagnosis  (S50.01XA) Contusion of right elbow, initial encounter  (M25.521) Right elbow pain Is there evidence the injured employee was under the influence of alcohol and/or another controlled substance at the time of accident?   Hour  10:50 AM Description of Injury or Disease  Diagnoses of Contusion of right elbow, initial encounter and Right elbow pain were pertinent to this visit. No   Treatment  Rest, Ice, NSAIDS.  Reevaluation in 4 days.  Have you advised the patient to remain off work five days or more? No   X-Ray Findings  Negative   If Yes   From Date  To Date      From information given by the employee, together with medical evidence, can you directly connect this injury or occupational disease as job incurred?  Yes If No Full Duty  Yes Modified Duty      Is additional medical care by a physician indicated?  Yes If Modified Duty, Specify any Limitations / Restrictions      Do you know of any previous injury or  "disease contributing to this condition or occupational disease?                            No   Date  12/26/2018 Print Doctor’s Name Martin Jay P.A.-C. I certify the employer’s copy of  this form was mailed on:   Address  [address] [employer]’s Use Only     [facility]  [address]    Provider’s Tax ID Number  [id number] Telephone  Dept: [phone]            e-Signature: Dr. Tex Blackwood, Medical Director Degree           ORIGINAL-TREATING PHYSICIAN OR CHIROPRACTOR    PAGE 2-INSURER/TPA    PAGE 3-EMPLOYER    PAGE 4-EMPLOYEE             Form C-4 (rev10/07)              BRIEF DESCRIPTION OF RIGHTS AND BENEFITS  (Pursuant to [place] 616[place])    Notice of Injury or Occupational Disease (Incident Report Form C-1): If an injury or occupational disease (OD) arises out of and in the  course of employment, you must provide written notice to your employer as soon as practicable, but no later than 7 days after the accident or  OD. Your employer shall maintain a sufficient supply of the required forms.    Claim for Compensation (Form C-4): If medical treatment is sought, the form C-4 is available at the place of initial treatment. A completed  \"Claim for Compensation\" (Form C-4) must be filed within 90 days after an accident or OD. The treating physician or chiropractor must,  within 3 working days after treatment, complete and mail to the employer, the employer's insurer and third-party , the Claim for  Compensation.    Medical Treatment: If you require medical treatment for your on-the-job injury or OD, you may be required to select a physician or  chiropractor from a list provided by your workers’ compensation insurer, if it has contracted with an Organization for Managed Care (MCO) or  Preferred Provider Organization (PPO) or providers of health care. If your employer has not entered into a contract with an MCO or PPO, you  may select a physician or chiropractor from the Panel of " Physicians and Chiropractors. Any medical costs related to your industrial injury or  OD will be paid by your insurer.    Temporary Total Disability (TTD): If your doctor has certified that you are unable to work for a period of at least 5 consecutive days, or 5  cumulative days in a 20-day period, or places restrictions on you that your employer does not accommodate, you may be entitled to TTD  compensation.    Temporary Partial Disability (TPD): If the wage you receive upon reemployment is less than the compensation for TTD to which you are  entitled, the insurer may be required to pay you TPD compensation to make up the difference. TPD can only be paid for a maximum of 24  months.    Permanent Partial Disability (PPD): When your medical condition is stable and there is an indication of a PPD as a result of your injury or  OD, within 30 days, your insurer must arrange for an evaluation by a rating physician or chiropractor to determine the degree of your PPD. The  amount of your PPD award depends on the date of injury, the results of the PPD evaluation and your age and wage.    Permanent Total Disability (PTD): If you are medically certified by a treating physician or chiropractor as permanently and totally disabled  and have been granted a PTD status by your insurer, you are entitled to receive monthly benefits not to exceed 66 2/3% of your average  monthly wage. The amount of your PTD payments is subject to reduction if you previously received a PPD award.    Vocational Rehabilitation Services: You may be eligible for vocational rehabilitation services if you are unable to return to the job due to a  permanent physical impairment or permanent restrictions as a result of your injury or occupational disease.    Transportation and Per Zak Reimbursement: You may be eligible for travel expenses and per zak associated with medical treatment.    Reopening: You may be able to reopen your claim if your condition  worsens after claim closure.    Appeal Process: If you disagree with a written determination issued by the insurer or the insurer does not respond to your request, you may  appeal to the Department of Administration, , by following the instructions contained in your determination letter. You must  appeal the determination within 70 days from the date of the determination letter at [address], [address], [place], [place]  [address], or [address], [place], [place] [address]. If you disagree with the  decision, you may appeal to the  Department of Administration, . You must file your appeal within 30 days from the date of the  decision  letter at [address], [address], [place], [place] [address], or [address], [place], [place] [address]. If you  disagree with a decision of an , you may file a petition for judicial review with the District Court. You must do so within 30  days of the Appeal Officer’s decision. You may be represented by an  at your own expense or you may contact the [facility] for possible  representation.    [place]  for Injured Workers (NAIW): If you disagree with a  decision, you may request that NAIW represent you  without charge at an  Hearing. For information regarding denial of benefits, you may contact the [facility] at: [address], [address], [place] [address], [phone], or [address], [place] [address], [phone]    To File a Complaint with the Division: If you wish to file a complaint with the  of the Division of Industrial Relations (DIR),  please contact the Workers’ Compensation Section, [address], [address], [place], [place] [address], telephone [phone], or  [address], [place] [address], telephone [phone])  [phone].    For assistance with Workers’ Compensation Issues: you may contact the Office of the Governor Consumer Health Assistance, [address] [place] [address], [address], [place] [address], Toll Free 1-983.577.9761, Web site: http://govcha.[email or web address]., E-mail  [email or web address].                                                                                                                                                                                                                                   __________________________________________________________________                                                                   _________________                Employee Name / Signature                                                                                                                                                       Date                                                                                                                                                                                                     D-2 (rev. 10/07)

## 2019-04-23 ENCOUNTER — APPOINTMENT (OUTPATIENT)
Dept: MEDICAL GROUP | Facility: PHYSICIAN GROUP | Age: 59
End: 2019-04-23
Payer: COMMERCIAL

## 2019-04-26 ENCOUNTER — OFFICE VISIT (OUTPATIENT)
Dept: MEDICAL GROUP | Facility: PHYSICIAN GROUP | Age: 59
End: 2019-04-26
Payer: COMMERCIAL

## 2019-04-26 VITALS
HEIGHT: 67 IN | WEIGHT: 251 LBS | DIASTOLIC BLOOD PRESSURE: 92 MMHG | RESPIRATION RATE: 20 BRPM | HEART RATE: 96 BPM | TEMPERATURE: 98.9 F | BODY MASS INDEX: 39.39 KG/M2 | OXYGEN SATURATION: 96 % | SYSTOLIC BLOOD PRESSURE: 140 MMHG

## 2019-04-26 DIAGNOSIS — R35.0 URINARY FREQUENCY: ICD-10-CM

## 2019-04-26 DIAGNOSIS — E55.9 VITAMIN D DEFICIENCY: ICD-10-CM

## 2019-04-26 DIAGNOSIS — I10 ESSENTIAL HYPERTENSION: ICD-10-CM

## 2019-04-26 DIAGNOSIS — E11.9 TYPE 2 DIABETES MELLITUS WITHOUT COMPLICATION, WITHOUT LONG-TERM CURRENT USE OF INSULIN (HCC): ICD-10-CM

## 2019-04-26 LAB
APPEARANCE UR: CLEAR
BILIRUB UR STRIP-MCNC: NORMAL MG/DL
COLOR UR AUTO: YELLOW
GLUCOSE UR STRIP.AUTO-MCNC: 500 MG/DL
HBA1C MFR BLD: 11.3 % (ref 0–5.6)
INT CON NEG: NEGATIVE
INT CON POS: POSITIVE
KETONES UR STRIP.AUTO-MCNC: NORMAL MG/DL
LEUKOCYTE ESTERASE UR QL STRIP.AUTO: NORMAL
NITRITE UR QL STRIP.AUTO: NORMAL
PH UR STRIP.AUTO: 6 [PH] (ref 5–8)
PROT UR QL STRIP: NORMAL MG/DL
RBC UR QL AUTO: NORMAL
SP GR UR STRIP.AUTO: 1.03
UROBILINOGEN UR STRIP-MCNC: 0.2 MG/DL

## 2019-04-26 PROCEDURE — 83036 HEMOGLOBIN GLYCOSYLATED A1C: CPT | Performed by: NURSE PRACTITIONER

## 2019-04-26 PROCEDURE — 99214 OFFICE O/P EST MOD 30 MIN: CPT | Performed by: NURSE PRACTITIONER

## 2019-04-26 PROCEDURE — 81002 URINALYSIS NONAUTO W/O SCOPE: CPT | Performed by: NURSE PRACTITIONER

## 2019-04-26 RX ORDER — LANCETS 30 GAUGE
EACH MISCELLANEOUS
Qty: 100 EACH | Refills: 0 | Status: SHIPPED | OUTPATIENT
Start: 2019-04-26

## 2019-04-26 RX ORDER — METFORMIN HYDROCHLORIDE 500 MG/1
500 TABLET, EXTENDED RELEASE ORAL 2 TIMES DAILY
Qty: 60 TAB | Refills: 2 | Status: SHIPPED | OUTPATIENT
Start: 2019-04-26 | End: 2019-08-07 | Stop reason: SDUPTHER

## 2019-04-26 ASSESSMENT — PATIENT HEALTH QUESTIONNAIRE - PHQ9: CLINICAL INTERPRETATION OF PHQ2 SCORE: 0

## 2019-04-26 ASSESSMENT — PAIN SCALES - GENERAL: PAINLEVEL: NO PAIN

## 2019-04-26 NOTE — LETTER
[facility]  MALLIKA Copeland.  [address] [place] [address]  Fax: [phone]   Authorization for Release/Disclosure of   Protected Health Information   Name: GAVIN JOHNSON : 1960 SSN: [id number]   Address: [address] [place] [address] Phone:    [phone] (home)    I authorize the entity listed below to release/disclose the PHI below to:   [facility]/JENNIFER Copeland and JENNIFER Copeland   Provider or Entity Name:     Address   City, State, Zip   Phone:      Fax:     Reason for request: continuity of care   Information to be released:    [  ] LAST COLONOSCOPY,  including any PATH REPORT and follow-up  [  ] LAST FIT/COLOGUARD RESULT [  ] LAST DEXA  [  ] LAST MAMMOGRAM  [  ] LAST PAP  [  ] LAST LABS [  ] RETINA EXAM REPORT  [  ] IMMUNIZATION RECORDS  [  ] Release all info      [  ] Check here and initial the line next to each item to release ALL health information INCLUDING  _____ Care and treatment for drug and / or alcohol abuse  _____ HIV testing, infection status, or AIDS  _____ Genetic Testing    DATES OF SERVICE OR TIME PERIOD TO BE DISCLOSED: _____________  I understand and acknowledge that:  * This Authorization may be revoked at any time by you in writing, except if your health information has already been used or disclosed.  * Your health information that will be used or disclosed as a result of you signing this authorization could be re-disclosed by the recipient. If this occurs, your re-disclosed health information may no longer be protected by State or Federal laws.  * You may refuse to sign this Authorization. Your refusal will not affect your ability to obtain treatment.  * This Authorization becomes effective upon signing and will  on (date) __________.      If no date is indicated, this Authorization will  one (1) year from the signature date.    Name: Gavin Johnson    Signature:   Date:     2019            PLEASE FAX REQUESTED RECORDS BACK TO: [phone]

## 2019-04-27 ENCOUNTER — HOSPITAL ENCOUNTER (OUTPATIENT)
Dept: LAB | Facility: MEDICAL CENTER | Age: 59
End: 2019-04-27
Attending: NURSE PRACTITIONER
Payer: COMMERCIAL

## 2019-04-27 DIAGNOSIS — E55.9 VITAMIN D DEFICIENCY: ICD-10-CM

## 2019-04-27 DIAGNOSIS — E11.9 TYPE 2 DIABETES MELLITUS WITHOUT COMPLICATION, WITHOUT LONG-TERM CURRENT USE OF INSULIN (HCC): ICD-10-CM

## 2019-04-27 LAB
25(OH)D3 SERPL-MCNC: 12 NG/ML (ref 30–100)
ALBUMIN SERPL BCP-MCNC: 4.1 G/DL (ref 3.2–4.9)
ALBUMIN/GLOB SERPL: 1.3 G/DL
ALP SERPL-CCNC: 73 U/L (ref 30–99)
ALT SERPL-CCNC: 44 U/L (ref 2–50)
ANION GAP SERPL CALC-SCNC: 5 MMOL/L (ref 0–11.9)
AST SERPL-CCNC: 24 U/L (ref 12–45)
BILIRUB SERPL-MCNC: 0.7 MG/DL (ref 0.1–1.5)
BUN SERPL-MCNC: 15 MG/DL (ref 8–22)
CALCIUM SERPL-MCNC: 9.4 MG/DL (ref 8.5–10.5)
CHLORIDE SERPL-SCNC: 101 MMOL/L (ref 96–112)
CHOLEST SERPL-MCNC: 228 MG/DL (ref 100–199)
CO2 SERPL-SCNC: 27 MMOL/L (ref 20–33)
CREAT SERPL-MCNC: 0.81 MG/DL (ref 0.5–1.4)
FASTING STATUS PATIENT QL REPORTED: NORMAL
GLOBULIN SER CALC-MCNC: 3.1 G/DL (ref 1.9–3.5)
GLUCOSE SERPL-MCNC: 258 MG/DL (ref 65–99)
HDLC SERPL-MCNC: 33 MG/DL
LDLC SERPL CALC-MCNC: 154 MG/DL
POTASSIUM SERPL-SCNC: 4.4 MMOL/L (ref 3.6–5.5)
PROT SERPL-MCNC: 7.2 G/DL (ref 6–8.2)
SODIUM SERPL-SCNC: 133 MMOL/L (ref 135–145)
T4 FREE SERPL-MCNC: 0.85 NG/DL (ref 0.53–1.43)
TRIGL SERPL-MCNC: 205 MG/DL (ref 0–149)
TSH SERPL DL<=0.005 MIU/L-ACNC: 0.84 UIU/ML (ref 0.38–5.33)

## 2019-04-27 PROCEDURE — 84439 ASSAY OF FREE THYROXINE: CPT

## 2019-04-27 PROCEDURE — 80061 LIPID PANEL: CPT

## 2019-04-27 PROCEDURE — 82306 VITAMIN D 25 HYDROXY: CPT

## 2019-04-27 PROCEDURE — 36415 COLL VENOUS BLD VENIPUNCTURE: CPT

## 2019-04-27 PROCEDURE — 84443 ASSAY THYROID STIM HORMONE: CPT

## 2019-04-27 PROCEDURE — 80053 COMPREHEN METABOLIC PANEL: CPT

## 2019-04-27 NOTE — ASSESSMENT & PLAN NOTE
New onset.  Point-of-care hemoglobin A1c is 11.3.  Patient reports he has been having increased urinary frequency for a few weeks, denies dysuria or hematuria.  Does admit that urinary frequency improved in the last week when he started to decrease carbohydrates in his diet.  Denies vision changes.  Advised patient of diabetes diagnosis and potential consequences.  Advised patient on lifestyle measures including low carbohydrate diet, routine aerobic exercise, weight loss.  Explained to patient that his hemoglobin A1c is very elevated and will need to start medication management also.  Patient reports he is in full agreement.  He wants to get his health under control.  His daughter is getting  this summer and he wants to be healthy so that he can attend her wedding.  We will also have him meet with diabetic nurse educator at next available appointment.

## 2019-04-27 NOTE — PATIENT INSTRUCTIONS
Type 2 Diabetes Mellitus, Diagnosis, Adult  Type 2 diabetes (type 2 diabetes mellitus) is a long-term (chronic) disease. It may be caused by one or both of these problems:  · Your body does not make enough of a hormone called insulin.  · Your body does not react in a normal way to insulin that it makes.  Insulin lets sugars (glucose) go into cells in the body. This gives you energy. If you have type 2 diabetes, sugars cannot get into cells. This causes high blood sugar (hyperglycemia).  Your doctor will set treatment goals for you. Generally, you should have these blood sugar levels:  · Before meals (preprandial):  mg/dL (4.4-7.2 mmol/L).  · After meals (postprandial): below 180 mg/dL (10 mmol/L).  · A1c (hemoglobin A1c) level: less than 7%.  Follow these instructions at home:  Questions to Ask Your Doctor   You may want to ask these questions:  · Do I need to meet with a diabetes educator?  · Where can I find a support group for people with diabetes?  · What equipment will I need to care for myself at home?  · What diabetes medicines do I need? When should I take them?  · How often do I need to check my blood sugar?  · What number can I call if I have questions?  · When is my next doctor's visit?  General instructions  · Take over-the-counter and prescription medicines only as told by your doctor.  · Keep all follow-up visits as told by your doctor. This is important.  Contact a doctor if:  · Your blood sugar is at or above 240 mg/dL (13.3 mmol/L) for 2 days in a row.  · You have been sick or have had a fever for 2 days or more and you are not getting better.  · You have any of these problems for more than 6 hours:  ¨ You cannot eat or drink.  ¨ You feel sick to your stomach (nauseous).  ¨ You throw up (vomit).  ¨ You have watery poop (diarrhea).  Get help right away if:  · Your blood sugar is lower than 54 mg/dL (3 mmol/L).  · You get confused.  · You have trouble:  ¨ Thinking clearly.  ¨ Breathing.  · You  have moderate or large ketone levels in your pee (urine).  This information is not intended to replace advice given to you by your health care provider. Make sure you discuss any questions you have with your health care provider.  Document Released: 09/26/2009 Document Revised: 05/25/2017 Document Reviewed: 01/20/2017  [employer] Patient Education © 2017 [employer]      2400 Calorie Diet for Diabetes Meal Planning  The 2400 calorie diet is designed for eating up to 2400 calories each day. Following this diet and making healthy meal choices can help improve overall health. This diet controls blood sugar (glucose) levels and can also lower blood pressure and cholesterol.   SERVING SIZES  Measuring foods and serving sizes helps to make sure you are getting the right amount of food. The list below tells how big or small some common serving sizes are.  · 1 oz.........4 stacked dice.   · 3 oz.........Deck of cards.   · 1 tsp........Tip of little finger.   · 1 tbs........Thumb.   · 2 tbs........Golf ball.   · ½ cup.......Half of a fist.   · 1 cup........A fist.   GUIDELINES FOR CHOOSING FOODS  The goal of this diet is to eat a variety of foods and limit calories to 2400 each day. This can be done by choosing foods that are low in calories and in fat. The diet also suggests eating small amounts of food often. Doing this helps control your blood glucose levels so they do not get too high or low. Each meal or snack should contain a protein food source to help you feel more satisfied and to stabilize your blood glucose. Try to eat about the same amount of food around the same time each day. This includes weekend days, travel days, and days off work. Space your meals about 4 to 5 hours apart and add a snack between them if you wish.   For example, a daily food plan could include breakfast, a morning snack, lunch, dinner, and an evening snack. Healthy meals and snacks include whole grains, vegetables, fruits, lean  meats, poultry, fish, and dairy products. As you plan your meals, choose a variety of foods. Choose from the bread and starches, vegetables, fruit, dairy, and meat/protein groups. Examples of foods from each group are listed below with their suggested serving sizes. Use measuring cups and spoons to become familiar with what a healthy portion looks like.  Bread and Starch  Each serving equals 15 grams of carbohydrates.  · 1 slice bread.   · ¼ bagel.   · ¾ cup cold cereal (unsweetened).   · ½ cup hot cereal or mashed potatoes.   · 1 small potato (size of a computer mouse).   ·  cup cooked pasta or rice.   · ½ English muffin.   · 1 cup broth-based soup.   · 3 cups of popcorn.   · 4 to 6 whole-wheat crackers.   · ½ cup cooked beans, peas, or corn.   Vegetable  Each serving equals 5 grams of carbohydrates.  · ½ cup cooked vegetables.   · 1 cup raw vegetables.   · ½ cup tomato or vegetable juice.   Fruit  Each serving equals 15 grams of carbohydrates.  · 1 small apple or orange.   · 1¼ cup watermelon or strawberries.   · ½ cup applesauce (no sugar added).   · 2 tbs raisins.   · ½ banana.   · ½ cup canned fruit, packed in water, in its own juice, or sweetened with a sugar substitute.   · ½ cup unsweetened fruit juice.   Dairy  Each serving equals 12 to 15 grams of carbohydrates.  · 1 cup fat-free milk.   · 6 oz artificially sweetened yogurt or plain yogurt.   · 1 cup low-fat buttermilk.   · 1 cup soy milk.   Meat/Protein  · 1 large egg.   · 2 to 3 oz meat, poultry, or fish.   · ½ cup low-fat cottage cheese.   · 1 tbs peanut butter.   · ½ cup tofu.   · 1 oz low-fat cheese.   · ¼ cup canned tuna in water.   Fat  · 1 tsp oil.   · 1 tsp trans-fat-free margarine.   · 1 tsp butter.   · 1 tsp mayonnaise.   · 2 tbs avocado.   SAMPLE 2400 CALORIE DIET PLAN  Breakfast  · 1 English muffin (2 carb servings).   · 1 scrambled egg.   · 1 tsp margarine.   · 1 cup fat-free milk (1 carb serving).   · 1 large orange (2 carb servings).    Morning Snack  · ¼ cup low-fat cottage cheese.   · ½ cup canned peaches in juice (1 carb serving).   · 1 cup carrot sticks.   Lunch  · Grilled chicken salad.   · 2 oz chicken breast.   · 1 cup bert lettuce or spinach.   · ½ cup diced tomato.   · ½ cup shredded carrots.   · ¼ cup sliced cucumbers.   · 2 tbs low-fat salad dressing.   · 2 slices whole-wheat bread (2 carb servings).   · 1 small apple (1 carb serving).   · 1 cup fat-free milk (1 carb serving).   · 15 baked chips ( 1 carb serving).   Afternoon Snack  · 8 reduced fat crackers (2 carb servings).   · 2 tbs peanut butter.   Dinner  · 3 oz salmon, broiled.   · 4½ small red potatoes, roasted with 1 tsp olive oil and seasoning (3 carb servings).   · 1 cup green beans.   · 1¼ cup strawberries (1 carb serving).   · 1 cup fat-free milk (1 carb serving).   Evening Snack  · 6 cups air-popped popcorn (2 carb servings).   · 2 tbs parmesan cheese sprinkled on top.   · 8 almonds.   MEAL PLAN  Use this worksheet to help you make a daily meal plan based on the 2400 calorie diet suggestions. The total amount of carbohydrates in your meal or snack is more important than making sure you include all of the food groups at every meal or snack. If you are using this plan to help you control your blood glucose, you may interchange carbohydrate-containing foods (dairy, starches, and fruits). Choose a variety of fresh foods of varying colors and flavors. You can choose from the following foods to build your day's meals:  · 12 Starches.   · 4 Vegetables.   · 4 Fruits.   · 3 Dairy.   · 7 oz Meat/Protein.   · Up to 8 Fats.   Your dietician can use this worksheet to help you decide how many servings and what types of foods are right for you.  BREAKFAST  Food Group and Servings / Food Choice  Starch ____________________________________________________  Dairy _____________________________________________________  Fruit  _____________________________________________________  Meat/Protein ______________________________________________  Fat________________________________________________________  LUNCH  Food Group and Servings / Food Choice   Starch ___________________________________________________  Meat/Protein _____________________________________________  Vegetable ________________________________________________  Fruit _____________________________________________________  Dairy ____________________________________________________  Fat_______________________________________________________  AFTERNOON SNACK  Food Group and Servings / Food Choice  Starch ___________________________________________________  Meat/Protein _____________________________________________  DINNER  Food Group and Servings / Food Choice  Starch ___________________________________________________  Meat/Protein _____________________________________________  Dairy ____________________________________________________  Vegetable ________________________________________________  Fruit _____________________________________________________  Fat_______________________________________________________  EVENING SNACK  Food Group and Servings / Food Choice  Fruit _____________________________________________________  Meat/Protein ______________________________________________  Starch ____________________________________________________  DAILY TOTALS  Starch __________________________  Vegetable _______________________  Fruits ___________________________  Dairy ___________________________  Meat/Protein ____________________  Fat _____________________________  Document Released: 07/10/2006 Document Revised: 03/11/2013 Document Reviewed: 11/02/2012  [employer]® Patient Information ©2013 [facility].      Check blood sugar in the morning before eating    Monitor blood pressure

## 2019-04-27 NOTE — PROGRESS NOTES
"  CC: Urinary frequency, \"I think I have diabetes\"    HISTORY OF THE PRESENT ILLNESS: Patient is a 58 y.o. male. This pleasant patient is here today for evaluation management the following health problems.  Patient was last seen in clinic over a year ago.  He was to return in 4 to 6 weeks and have updated lab work done for review.  Patient reports he got busy with work and life and did not follow-up.      Type 2 diabetes mellitus without complication, without long-term current use of insulin ([facility])  New onset.  Point-of-care hemoglobin A1c is 11.3.  Patient reports he has been having increased urinary frequency for a few weeks, denies dysuria or hematuria.  Does admit that urinary frequency improved in the last week when he started to decrease carbohydrates in his diet.  Denies vision changes.  Advised patient of diabetes diagnosis and potential consequences.  Advised patient on lifestyle measures including low carbohydrate diet, routine aerobic exercise, weight loss.  Explained to patient that his hemoglobin A1c is very elevated and will need to start medication management also.  Patient reports he is in full agreement.  He wants to get his health under control.  His daughter is getting  this summer and he wants to be healthy so that he can attend her wedding.  We will also have him meet with diabetic nurse educator at next available appointment.      Essential hypertension  Patient's blood pressure is elevated today at appointment at 140/92.  Patient reports his blood pressure at home has been averaging 120s/80s.  The patient denies chest pain, shortness of breath, headache, vision changes, epistaxis, or dyspnea on exertion.  Patient does not smoke.  Reviewed lifestyle measures, including DASH diet, routine aerobic exercise, weight loss.  I did advise patient that a low dose ACE inhibitor is recommended for people with diabetes to help preserve kidney function.  We will discuss further at next " appointment.      Allergies: Patient has no known allergies.    Current Outpatient Prescriptions Ordered in [facility]   Medication Sig Dispense Refill   • metFORMIN ER (GLUCOPHAGE XR) 500 MG TABLET SR 24 HR Take 1 Tab by mouth 2 times a day. 60 Tab 2   • Blood Glucose Meter Kit Test blood sugar as recommended by provider. Insurance covered blood glucose monitoring kit. 1 Kit 0   • Lancets Use one insurance covered lancet to test blood sugar twice daily. 100 Each 0   • Blood Glucose Test Strips Use one Insurance covered strip to test blood sugar twice daily. 100 Strip 0     No current Epic-ordered facility-administered medications on file.        No past medical history on file.    Past Surgical History:   Procedure Laterality Date   • EXPLORATORY LAPAROTOMY  1/6/2014    Performed by Elmer Bojorquez M.D. at SURGERY [facility]       Social History   Substance Use Topics   • Smoking status: Former Smoker     Packs/day: 0.50     Years: 10.00     Types: Cigarettes     Quit date: 2/4/1999   • Smokeless tobacco: Never Used   • Alcohol use 1.2 oz/week     2 Cans of beer per week      Comment: occ       Family History   Problem Relation Age of Onset   • Cancer Mother         ovarian   • Cancer Father    • Heart Disease Paternal Grandfather    • Heart Attack Paternal Grandfather    • Diabetes Neg Hx        ROS:     - Constitutional: Negative for fever, chills, unexpected weight change.     - HEENT: Negative for headaches, vision changes, hearing changes, ear pain, rhinorrhea, sinus congestion, and sore throat.      - Respiratory: Negative for cough, dyspnea, and wheezing.      - Cardiovascular: Negative for chest pain, palpitations, orthopnea, and bilateral lower extremity edema.     - Gastrointestinal: Negative for heartburn, nausea, vomiting, abdominal pain, hematochezia, melena, diarrhea, constipation.     - Genitourinary: As in HPI.    - Skin: Negative for rash.     - Neurological: Negative for dizziness, tingling.  "    - Endo/Heme/Allergies: Does not bruise/bleed easily.     - Psychiatric/Behavioral: Negative for depression.           Exam: /92   Pulse 96   Temp 37.2 °C (98.9 °F) (Temporal)   Resp 20   Ht 1.702 m (5' 7\")   Wt 113.9 kg (251 lb)   SpO2 96%  Body mass index is 39.31 kg/m².    General: Alert, pleasant, obese habitus, well nourished, well developed male in NAD  HEENT: Normocephalic. Eyes conjunctiva clear lids without ptosis, pupils equal and reactive to light, ears normal shape and contour, canals are clear bilaterally, tympanic membranes are pearly gray with good light reflex, nasal mucosa without erythema and drainage, oropharynx is without erythema, edema or exudates.   Neck: Supple without bruit. Thyroid is not enlarged.  Pulmonary: Clear to ausculation.  Normal effort. No rales, ronchi, or wheezing.  Cardiovascular: Normal rate and rhythm without murmur. Carotid and radial pulses are intact and equal bilaterally.  No lower extremity edema.  Abdomen: Soft, nontender, distended. Normal bowel sounds.  Unable to appreciate liver and spleen due to body habitus.  Neurologic: Grossly nonfocal  Lymph: No cervical or supraclavicular lymph nodes are palpable  Skin: Warm and dry.  No obvious lesions.  Musculoskeletal: Normal gait.   Psych: Normal mood and affect. Alert and oriented. Judgment and insight is normal.    Component      Latest Ref Rng & Units 4/26/2019           5:00 PM   POC Color      Negative yellow   POC Appearance      Negative clear   POC Leukocyte Esterase      Negative neg   POC Nitrites      Negative neg   POC Urobiligen      Negative (0.2) mg/dL 0.2   POC Protein      Negative mg/dL trace   POC Urine PH      5.0 - 8.0 6.0   POC Blood      Negative neg   POC Specific Gravity      <1.005 - >1.030 1.030   POC Ketones      Negative mg/dL neg   POC Bilirubin      Negative mg/dL neg   POC Glucose      Negative mg/dL 500       Please note that this dictation was created using voice recognition " software. I have made every reasonable attempt to correct obvious errors, but I expect that there are errors of grammar and possibly content that I did not discover before finalizing the note.      Assessment/Plan  1. Type 2 diabetes mellitus without complication, without long-term current use of insulin (HCC)  This is a new diagnosis for patient.  We did discuss disease process and potential consequences, in addition to management.  Patient will start metformin 500 mg twice a day.  Patient will monitor blood sugar at home 1-2 times a day, at least one reading as fasting.  Patient will bring in readings to next appointment.  Patient will work on lifestyle measures.  Handout on diabetic diet has been given to patient.  He will return to clinic next week to meet with diabetic nurse educator and myself.  - POCT  A1C  - metFORMIN ER (GLUCOPHAGE XR) 500 MG TABLET SR 24 HR; Take 1 Tab by mouth 2 times a day.  Dispense: 60 Tab; Refill: 2  - Comp Metabolic Panel; Future  - Lipid Profile; Future  - TSH; Future  - FREE THYROXINE; Future  - ESTIMATED GFR; Future  - Blood Glucose Meter Kit; Test blood sugar as recommended by provider. Insurance covered blood glucose monitoring kit.  Dispense: 1 Kit; Refill: 0  - Lancets; Use one insurance covered lancet to test blood sugar twice daily.  Dispense: 100 Each; Refill: 0  - Blood Glucose Test Strips; Use one Insurance covered strip to test blood sugar twice daily.  Dispense: 100 Strip; Refill: 0    2. Urinary frequency  Point-of-care urinalysis non-concerning for infection.  Does show elevated glucose.  - POCT Urinalysis    3. Vitamin D deficiency  We will review lab results with patient at next appointment.  - VITAMIN D,25 HYDROXY; Future    4. Essential hypertension  Patient will monitor blood pressure at home and bring in readings to next appointment.  He will bring his cuff in to next appointment for comparison.  Patient will work on lifestyle measures.  Consider starting  low-dose ACE inhibitor.  ER precautions reviewed with patient.    Patient will return to clinic in 1 week to meet with diabetic nurse educator and myself for lab review, hypertension, dyslipidemia.

## 2019-04-29 PROBLEM — I10 ESSENTIAL HYPERTENSION: Status: ACTIVE | Noted: 2018-03-09

## 2019-04-29 NOTE — ASSESSMENT & PLAN NOTE
Patient's blood pressure is elevated today at appointment at 140/92.  Patient reports his blood pressure at home has been averaging 120s/80s.  The patient denies chest pain, shortness of breath, headache, vision changes, epistaxis, or dyspnea on exertion.  Patient does not smoke.  Reviewed lifestyle measures, including DASH diet, routine aerobic exercise, weight loss.  I did advise patient that a low dose ACE inhibitor is recommended for people with diabetes to help preserve kidney function.  We will discuss further at next appointment.

## 2019-05-02 ENCOUNTER — OFFICE VISIT (OUTPATIENT)
Dept: MEDICAL GROUP | Facility: PHYSICIAN GROUP | Age: 59
End: 2019-05-02
Payer: COMMERCIAL

## 2019-05-02 VITALS
SYSTOLIC BLOOD PRESSURE: 130 MMHG | HEIGHT: 67 IN | BODY MASS INDEX: 37.83 KG/M2 | DIASTOLIC BLOOD PRESSURE: 72 MMHG | WEIGHT: 241 LBS | OXYGEN SATURATION: 95 % | TEMPERATURE: 98.5 F | RESPIRATION RATE: 16 BRPM | HEART RATE: 85 BPM

## 2019-05-02 DIAGNOSIS — I10 ESSENTIAL HYPERTENSION: ICD-10-CM

## 2019-05-02 DIAGNOSIS — Z12.11 COLON CANCER SCREENING: ICD-10-CM

## 2019-05-02 DIAGNOSIS — E66.9 OBESITY (BMI 35.0-39.9 WITHOUT COMORBIDITY): ICD-10-CM

## 2019-05-02 DIAGNOSIS — E55.9 VITAMIN D DEFICIENCY: ICD-10-CM

## 2019-05-02 DIAGNOSIS — E78.2 MIXED HYPERLIPIDEMIA: ICD-10-CM

## 2019-05-02 DIAGNOSIS — E11.9 TYPE 2 DIABETES MELLITUS WITHOUT COMPLICATION, WITHOUT LONG-TERM CURRENT USE OF INSULIN (HCC): ICD-10-CM

## 2019-05-02 PROCEDURE — 99215 OFFICE O/P EST HI 40 MIN: CPT | Performed by: NURSE PRACTITIONER

## 2019-05-02 RX ORDER — LISINOPRIL 5 MG/1
5 TABLET ORAL DAILY
Qty: 30 TAB | Refills: 2 | Status: SHIPPED | OUTPATIENT
Start: 2019-05-02 | End: 2019-08-07 | Stop reason: SDUPTHER

## 2019-05-02 RX ORDER — ERGOCALCIFEROL 1.25 MG/1
50000 CAPSULE ORAL
Qty: 12 CAP | Refills: 0 | Status: SHIPPED | OUTPATIENT
Start: 2019-05-02 | End: 2019-08-13

## 2019-05-02 RX ORDER — PRAVASTATIN SODIUM 40 MG
40 TABLET ORAL DAILY
Qty: 30 TAB | Refills: 2 | Status: SHIPPED | OUTPATIENT
Start: 2019-05-02 | End: 2019-08-07 | Stop reason: SDUPTHER

## 2019-05-02 NOTE — PATIENT INSTRUCTIONS
Call centralized scheduling [phone] to schedule appointment to establish care with different provider at [facility] and with diabetic nurse educator, reschedule for 3 months.  Have lab work done prior to appointment.    Your vitamin D level is low, indicating you have vitamin D deficiency. Vitamin D is important in the absorption of calcium, which is instrumental in bone strength.  I have sent in a prescription to your pharmacy for Vitamin D 50,000 units once a week for 12 weeks.  Once you are done with weekly vitamin D, start taking over the counter supplemental Vitamin D3 4000 units daily.

## 2019-05-02 NOTE — PROGRESS NOTES
CC: Diabetes, hypertension, lab review    HISTORY OF THE PRESENT ILLNESS: Patient is a 58 y.o. male. This pleasant patient is here today for evaluation management the following health problems.    Hyperlipidemia  Chronic health problem, uncontrolled.  Patient not taking any medication for hyperlipidemia.  Reports he has not been working on lifestyle measures as his work takes up a great deal of time and he is unable to exercise, not following a low-fat diet. The 10-year ASCVD risk score (Madelinemary AMATO Jr., et al., 2013) is: 23.1% recommendations are to start a moderate to high intensity statin.  Reviewed ASCVD risk with patient and recommendations.  Patient is open to starting statin.  Reviewed lifestyle measures.  Patient is determined to make an effort on lifestyle measures for his health.      Component      Latest Ref Rng & Units 2/26/2018 4/27/2019           6:11 AM  8:05 AM   Cholesterol,Tot      100 - 199 mg/dL 225 (H) 228 (H)   Triglycerides      0 - 149 mg/dL 245 (H) 205 (H)   HDL      >=40 mg/dL 31 (A) 33 (A)   LDL      <100 mg/dL 145 (H) 154 (H)         Essential hypertension  Chronic health problem.  Patient does not take medications.  He has been monitoring blood pressures at home, which have been ranging in 140-150/80-90.  Blood pressure today is the best it has been at appointment at 130/72.  Patient reports he has been working on lifestyle measures in the last week.  As he now has diabetes and blood pressure readings at home are mildly high, would like to start low-dose lisinopril.  Patient is agreeable to plan.  He will continue to monitor blood pressures at home.  The patient denies chest pain, shortness of breath, headache, vision changes, epistaxis, or dyspnea on exertion.    Type 2 diabetes mellitus without complication, without long-term current use of insulin (HCC)  This is relatively new onset.  Patient started metformin a week ago.  He reports he is tolerating medication and denies side effects.   He has been monitoring his blood sugars at home, which have been running 100-200.  Denies hypoglycemic episodes.  Patient met with diabetic nurse educator today.  I have reviewed and agree with her evaluation and recommendations.  Patient will continue with metformin 500 mg daily.  Patient is due for eye exam.  He reports he will make an appointment with his optometrist and have records sent to clinic.  Denies polyuria, vision changes, numbness and tingling.    Vitamin D deficiency  This is a chronic health problem that is uncontrolled with current medications and lifestyle measures.  Patient not taking vitamin D supplementation.  Had taken weekly high-dose vitamin D for 12 weeks about a year ago.  Serum vitamin D level is low.  Reviewed the importance of vitamin D and calcium absorption and that low vitamin D can also contribute to obesity, fatigue, heart disease.  Patient will start high-dose weekly vitamin D for 12 weeks.  When done with weekly vitamin D, he will start daily supplemental vitamin D3 4000 units daily.    Component      Latest Ref Rng & Units 2/26/2018 4/27/2019           6:11 AM  8:05 AM   25-Hydroxy   Vitamin D 25      30 - 100 ng/mL 9 (L) 12 (L)       Allergies: Patient has no known allergies.    Current Outpatient Prescriptions Ordered in [facility]   Medication Sig Dispense Refill   • pravastatin (PRAVACHOL) 40 MG tablet Take 1 Tab by mouth every day. 30 Tab 2   • lisinopril (PRINIVIL) 5 MG Tab Take 1 Tab by mouth every day. 30 Tab 2   • vitamin D, Ergocalciferol, (DRISDOL) 35991 units Cap capsule Take 1 Cap by mouth every 7 days. 12 Cap 0   • metFORMIN ER (GLUCOPHAGE XR) 500 MG TABLET SR 24 HR Take 1 Tab by mouth 2 times a day. 60 Tab 2   • Blood Glucose Meter Kit Test blood sugar as recommended by provider. Insurance covered blood glucose monitoring kit. 1 Kit 0   • Lancets Use one insurance covered lancet to test blood sugar twice daily. 100 Each 0   • Blood Glucose Test Strips Use one Insurance  "covered strip to test blood sugar twice daily. 100 Strip 0     No current Epic-ordered facility-administered medications on file.        No past medical history on file.    Past Surgical History:   Procedure Laterality Date   • EXPLORATORY LAPAROTOMY  1/6/2014    Performed by Elmer Bojorquez M.D. at SURGERY [facility]       Social History   Substance Use Topics   • Smoking status: Former Smoker     Packs/day: 0.50     Years: 10.00     Types: Cigarettes     Quit date: 2/4/1999   • Smokeless tobacco: Never Used   • Alcohol use 1.2 oz/week     2 Cans of beer per week      Comment: occ       Family History   Problem Relation Age of Onset   • Cancer Mother         ovarian   • Cancer Father    • Heart Disease Paternal Grandfather    • Heart Attack Paternal Grandfather    • Diabetes Neg Hx        ROS:   As in HPI, otherwise negative for chest pain, dyspnea, abdominal pain, dysuria, blood in stool, fever           Exam: /72 (BP Location: Right arm, Patient Position: Sitting, BP Cuff Size: Adult)   Pulse 85   Temp 36.9 °C (98.5 °F)   Resp 16   Ht 1.702 m (5' 7\")   Wt 109.3 kg (241 lb)   SpO2 95%  Body mass index is 37.75 kg/m².    General: Alert, pleasant, obese habitus, well nourished, well developed male in NAD  Neck: Supple without bruit. Thyroid is not enlarged.  Pulmonary: Clear to ausculation.  Normal effort. No rales, ronchi, or wheezing.  Cardiovascular: Normal rate and rhythm without murmur. Carotid and radial pulses are intact and equal bilaterally.  No lower extremity edema.  Abdomen: Soft, nontender, distended. Normal bowel sounds.   Neurologic: Grossly nonfocal  Lymph: No cervical or supraclavicular lymph nodes are palpable  Skin: Warm and dry.  No obvious lesions.  Musculoskeletal: Normal gait.   Psych: Normal mood and affect. Alert and oriented. Judgment and insight is normal.    Please note that this dictation was created using voice recognition software. I have made every reasonable " attempt to correct obvious errors, but I expect that there are errors of grammar and possibly content that I did not discover before finalizing the note.      Assessment/Plan  1. Mixed hyperlipidemia  Patient will start pravastatin 40 mg daily.  Instructions and side effects reviewed with patient.  If he notices body aches or weakness he will try taking statin every other day to see if body aches improve.  Patient will work on lifestyle measures.  Will recheck lipid profile in 3 months.  - pravastatin (PRAVACHOL) 40 MG tablet; Take 1 Tab by mouth every day.  Dispense: 30 Tab; Refill: 2  - Lipid Profile; Future    2. Essential hypertension  Patient will start lisinopril 5 mg daily.  He will continue to monitor blood pressures.  He will work on lifestyle measures including routine aerobic exercise, DASH diet.  ER precautions reviewed with patient.  - lisinopril (PRINIVIL) 5 MG Tab; Take 1 Tab by mouth every day.  Dispense: 30 Tab; Refill: 2  - Comp Metabolic Panel; Future  - ESTIMATED GFR; Future    3. Type 2 diabetes mellitus without complication, without long-term current use of insulin ([facility])  Patient met with diabetic nurse educator today.  After meeting with her, he understands diabetes and management much better than before.  He will continue with metformin 500 mg daily.  He will continue monitoring blood sugars at home.  Continue working on lifestyle measures.  - HEMOGLOBIN A1C; Future  - Lipid Profile; Future  - Comp Metabolic Panel; Future  - MICROALBUMIN CREAT RATIO URINE; Future    4. Vitamin D deficiency  Patient will start high-dose weekly vitamin D for 12 weeks.  Once done with weekly vitamin D, he will start vitamin D3 4000 units daily.  - vitamin D, Ergocalciferol, (DRISDOL) 38096 units Cap capsule; Take 1 Cap by mouth every 7 days.  Dispense: 12 Cap; Refill: 0    5. Colon cancer screening  Patient referred to GI for routine screening colonoscopy.  - REFERRAL TO GI FOR COLONOSCOPY    6. Obesity (BMI  "35.0-39.9 without comorbidity)    - Patient identified as having weight management issue.  Appropriate orders and counseling given.      RN-CDE notes reviewed and discussed.    The total time spent seeing the patient in consultation, and formulating an action plan for this visit was 40 minutes.  50% of this time was spent counseling, discussing problems documented above, coordinating care and answering questions by the provider and registered nurse--certified diabetes educator.       Patient will return to clinic in 3 months with diabetic nurse educator and primary care provider.  I did explain to patient that I will be transferring to [facility].  Patient is uncertain if he would like to stay in [place] or go to [place].  When he decides he will call centralized scheduling to schedule appointment with provider in [place] to establish care with diabetic nurse educator or to set up appointment with me and diabetic nurse educator in [place].    RN-CDE Note    Subjective:     Health changes since last visit/interval Hx: None    Medications (including changes made today)  Current Outpatient Prescriptions   Medication Sig Dispense Refill   • metFORMIN ER (GLUCOPHAGE XR) 500 MG TABLET SR 24 HR Take 1 Tab by mouth 2 times a day. 60 Tab 2   • Blood Glucose Meter Kit Test blood sugar as recommended by provider. Insurance covered blood glucose monitoring kit. 1 Kit 0   • Lancets Use one insurance covered lancet to test blood sugar twice daily. 100 Each 0   • Blood Glucose Test Strips Use one Insurance covered strip to test blood sugar twice daily. 100 Strip 0     No current facility-administered medications for this visit.        Taking daily ASA: No  Taking above medications as prescribed: yes  SIDE EFFECTS: Patient denies side effects to medications    Exercise: no regular exercise, sedentary  Diet: \"healthy\" diet  in general  Patient's body mass index is 37.75 kg/m². Exercise and nutrition counseling were performed at " this visit.      Health Maintenance:   Health Maintenance Due   Topic Date Due   • DIABETES MONOFILAMENT / LE EXAM  06/17/1962   • RETINAL SCREENING  12/17/1979   • URINE ACR / MICROALBUMIN  12/17/1979   • IMM HEP B VACCINE (1 of 3 - Risk 3-dose series) 12/17/1980   • IMM DTaP/Tdap/Td Vaccine (1 - Tdap) 12/17/1980   • IMM PNEUMOCOCCAL 19-64 (ADULT) MEDIUM RISK SERIES (1 of 1 - PPSV23) 12/17/1980   • COLONOSCOPY  12/17/2011   • IMM ZOSTER VACCINES (1 of 2) 12/17/2011       Immunizations:   PPSV23: Due  [id number]: Due  Tdap: Due  Flu: Due  Hep B: Due    DM:   Last A1c:   Lab Results   Component Value Date/Time    HBA1C 11.3 (A) 04/26/2019 04:55 PM      A1C GOAL: < 7    Glucose monitoring frequency: daily   100-200  Hypoglycemic episodes: no    Last Retinal Exam:will schedule  Daily Foot Exam: Yes   Routine Dental Exams: Yes    No results found for: MICROALBCALC, MALBCRT, MALBEXCR, FOEAGH86, MICROALBUR, MICRALB, UMICROALBUM, MICROALBTIM     ACR Albumin/Creatinine Ratio goal <30     HTN:   Blood pressure goal <140/<80 .   Currently Rx ACE/ARB: No    Dyslipidemia:    Lab Results   Component Value Date/Time    CHOLSTRLTOT 228 (H) 04/27/2019 08:05 AM     (H) 04/27/2019 08:05 AM    HDL 33 (A) 04/27/2019 08:05 AM    TRIGLYCERIDE 205 (H) 04/27/2019 08:05 AM       Lab Results   Component Value Date/Time    SODIUM 133 (L) 04/27/2019 08:05 AM    POTASSIUM 4.4 04/27/2019 08:05 AM    CHLORIDE 101 04/27/2019 08:05 AM    CO2 27 04/27/2019 08:05 AM    GLUCOSE 258 (H) 04/27/2019 08:05 AM    BUN 15 04/27/2019 08:05 AM    CREATININE 0.81 04/27/2019 08:05 AM     Lab Results   Component Value Date/Time    ALKPHOSPHAT 73 04/27/2019 08:05 AM    ASTSGOT 24 04/27/2019 08:05 AM    ALTSGPT 44 04/27/2019 08:05 AM    TBILIRUBIN 0.7 04/27/2019 08:05 AM        Currently Rx Statin: No    He  reports that he quit smoking about 20 years ago. His smoking use included Cigarettes. He has a 5.00 pack-year smoking history. He has never used  smokeless tobacco.    Objective:     Exam:  Monofilament: done   Monofilament testing with a 10 gram force: sensation intact: intact bilaterally  Visual Inspection: Feet without maceration, ulcers, fissures.  Pedal pulses: intact bilaterally    Plan:     Discussed and educated on:   - All medications, side effects and compliance (discussed carefully)  - Annual eye examinations at Ophthalmology  - Diabetic Meal Plan: foods that contain carbs  - Home glucose monitoring emphasized  - Weight control and daily exercise    Recommended medication changes: add 30 minutes of exercise and decrease carbohydrate intake. Will see in  3 months unless needs arise

## 2019-05-02 NOTE — ASSESSMENT & PLAN NOTE
Chronic health problem, uncontrolled.  Patient not taking any medication for hyperlipidemia.  Reports he has not been working on lifestyle measures as his work takes up a great deal of time and he is unable to exercise, not following a low-fat diet. The 10-year ASCVD risk score (Madelinemary AMATO Jr., et al., 2013) is: 23.1% recommendations are to start a moderate to high intensity statin.  Reviewed ASCVD risk with patient and recommendations.  Patient is open to starting statin.  Reviewed lifestyle measures.  Patient is determined to make an effort on lifestyle measures for his health.      Component      Latest Ref Rng & Units 2/26/2018 4/27/2019           6:11 AM  8:05 AM   Cholesterol,Tot      100 - 199 mg/dL 225 (H) 228 (H)   Triglycerides      0 - 149 mg/dL 245 (H) 205 (H)   HDL      >=40 mg/dL 31 (A) 33 (A)   LDL      <100 mg/dL 145 (H) 154 (H)

## 2019-05-05 NOTE — ASSESSMENT & PLAN NOTE
This is relatively new onset.  Patient started metformin a week ago.  He reports he is tolerating medication and denies side effects.  He has been monitoring his blood sugars at home, which have been running 100-200.  Denies hypoglycemic episodes.  Patient met with diabetic nurse educator today.  I have reviewed and agree with her evaluation and recommendations.  Patient will continue with metformin 500 mg daily.  Patient is due for eye exam.  He reports he will make an appointment with his optometrist and have records sent to clinic.  Denies polyuria, vision changes, numbness and tingling.

## 2019-05-05 NOTE — ASSESSMENT & PLAN NOTE
This is a chronic health problem that is uncontrolled with current medications and lifestyle measures.  Patient not taking vitamin D supplementation.  Had taken weekly high-dose vitamin D for 12 weeks about a year ago.  Serum vitamin D level is low.  Reviewed the importance of vitamin D and calcium absorption and that low vitamin D can also contribute to obesity, fatigue, heart disease.  Patient will start high-dose weekly vitamin D for 12 weeks.  When done with weekly vitamin D, he will start daily supplemental vitamin D3 4000 units daily.    Component      Latest Ref Rng & Units 2/26/2018 4/27/2019           6:11 AM  8:05 AM   25-Hydroxy   Vitamin D 25      30 - 100 ng/mL 9 (L) 12 (L)

## 2019-07-20 ENCOUNTER — HOSPITAL ENCOUNTER (OUTPATIENT)
Dept: LAB | Facility: MEDICAL CENTER | Age: 59
End: 2019-07-20
Attending: NURSE PRACTITIONER
Payer: COMMERCIAL

## 2019-07-20 DIAGNOSIS — E11.9 TYPE 2 DIABETES MELLITUS WITHOUT COMPLICATION, WITHOUT LONG-TERM CURRENT USE OF INSULIN (HCC): ICD-10-CM

## 2019-07-20 DIAGNOSIS — I10 ESSENTIAL HYPERTENSION: ICD-10-CM

## 2019-07-20 DIAGNOSIS — E78.2 MIXED HYPERLIPIDEMIA: ICD-10-CM

## 2019-07-20 LAB
ALBUMIN SERPL BCP-MCNC: 4.2 G/DL (ref 3.2–4.9)
ALBUMIN/GLOB SERPL: 1.3 G/DL
ALP SERPL-CCNC: 54 U/L (ref 30–99)
ALT SERPL-CCNC: 28 U/L (ref 2–50)
ANION GAP SERPL CALC-SCNC: 7 MMOL/L (ref 0–11.9)
AST SERPL-CCNC: 18 U/L (ref 12–45)
BILIRUB SERPL-MCNC: 0.4 MG/DL (ref 0.1–1.5)
BUN SERPL-MCNC: 17 MG/DL (ref 8–22)
CALCIUM SERPL-MCNC: 9.1 MG/DL (ref 8.5–10.5)
CHLORIDE SERPL-SCNC: 104 MMOL/L (ref 96–112)
CHOLEST SERPL-MCNC: 173 MG/DL (ref 100–199)
CO2 SERPL-SCNC: 27 MMOL/L (ref 20–33)
CREAT SERPL-MCNC: 0.79 MG/DL (ref 0.5–1.4)
CREAT UR-MCNC: 61.5 MG/DL
EST. AVERAGE GLUCOSE BLD GHB EST-MCNC: 148 MG/DL
GLOBULIN SER CALC-MCNC: 3.3 G/DL (ref 1.9–3.5)
GLUCOSE SERPL-MCNC: 125 MG/DL (ref 65–99)
HBA1C MFR BLD: 6.8 % (ref 0–5.6)
HDLC SERPL-MCNC: 31 MG/DL
LDLC SERPL CALC-MCNC: 118 MG/DL
MICROALBUMIN UR-MCNC: <0.7 MG/DL
MICROALBUMIN/CREAT UR: NORMAL MG/G (ref 0–30)
POTASSIUM SERPL-SCNC: 4.7 MMOL/L (ref 3.6–5.5)
PROT SERPL-MCNC: 7.5 G/DL (ref 6–8.2)
SODIUM SERPL-SCNC: 138 MMOL/L (ref 135–145)
TRIGL SERPL-MCNC: 122 MG/DL (ref 0–149)

## 2019-07-20 PROCEDURE — 80053 COMPREHEN METABOLIC PANEL: CPT

## 2019-07-20 PROCEDURE — 82043 UR ALBUMIN QUANTITATIVE: CPT

## 2019-07-20 PROCEDURE — 83036 HEMOGLOBIN GLYCOSYLATED A1C: CPT

## 2019-07-20 PROCEDURE — 36415 COLL VENOUS BLD VENIPUNCTURE: CPT

## 2019-07-20 PROCEDURE — 80061 LIPID PANEL: CPT

## 2019-07-20 PROCEDURE — 82570 ASSAY OF URINE CREATININE: CPT

## 2019-08-07 DIAGNOSIS — E11.9 TYPE 2 DIABETES MELLITUS WITHOUT COMPLICATION, WITHOUT LONG-TERM CURRENT USE OF INSULIN (HCC): ICD-10-CM

## 2019-08-07 DIAGNOSIS — E78.2 MIXED HYPERLIPIDEMIA: ICD-10-CM

## 2019-08-07 DIAGNOSIS — I10 ESSENTIAL HYPERTENSION: ICD-10-CM

## 2019-08-07 RX ORDER — METFORMIN HYDROCHLORIDE 500 MG/1
500 TABLET, EXTENDED RELEASE ORAL 2 TIMES DAILY
Qty: 60 TAB | Refills: 0 | Status: SHIPPED | OUTPATIENT
Start: 2019-08-07 | End: 2019-08-13 | Stop reason: SDUPTHER

## 2019-08-07 RX ORDER — LISINOPRIL 5 MG/1
5 TABLET ORAL DAILY
Qty: 30 TAB | Refills: 0 | Status: SHIPPED | OUTPATIENT
Start: 2019-08-07 | End: 2019-08-13 | Stop reason: SDUPTHER

## 2019-08-07 RX ORDER — PRAVASTATIN SODIUM 40 MG
40 TABLET ORAL DAILY
Qty: 30 TAB | Refills: 0 | Status: SHIPPED | OUTPATIENT
Start: 2019-08-07 | End: 2019-08-13 | Stop reason: SDUPTHER

## 2019-08-07 NOTE — TELEPHONE ENCOUNTER
Pt came in today to request a Rx for lisinopril, metformin, and pravastiatin. At least enough to last him to his upcoming appointment on 8/13/19. Pt states he had an appt to see Provider on 7/29/19, but it was cancelled due to an Emergency for Povider.   If possible, please send to [facility] [address] in [place].

## 2019-08-13 ENCOUNTER — OFFICE VISIT (OUTPATIENT)
Dept: MEDICAL GROUP | Facility: PHYSICIAN GROUP | Age: 59
End: 2019-08-13
Payer: COMMERCIAL

## 2019-08-13 VITALS
SYSTOLIC BLOOD PRESSURE: 126 MMHG | RESPIRATION RATE: 20 BRPM | BODY MASS INDEX: 36.41 KG/M2 | DIASTOLIC BLOOD PRESSURE: 76 MMHG | TEMPERATURE: 99.2 F | HEART RATE: 86 BPM | HEIGHT: 67 IN | WEIGHT: 232 LBS | OXYGEN SATURATION: 96 %

## 2019-08-13 DIAGNOSIS — E55.9 VITAMIN D DEFICIENCY: ICD-10-CM

## 2019-08-13 DIAGNOSIS — E11.9 TYPE 2 DIABETES MELLITUS WITHOUT COMPLICATION, WITHOUT LONG-TERM CURRENT USE OF INSULIN (HCC): ICD-10-CM

## 2019-08-13 DIAGNOSIS — R53.83 FATIGUE, UNSPECIFIED TYPE: ICD-10-CM

## 2019-08-13 DIAGNOSIS — E78.2 MIXED HYPERLIPIDEMIA: ICD-10-CM

## 2019-08-13 DIAGNOSIS — I10 ESSENTIAL HYPERTENSION: ICD-10-CM

## 2019-08-13 PROCEDURE — 99214 OFFICE O/P EST MOD 30 MIN: CPT | Performed by: NURSE PRACTITIONER

## 2019-08-13 RX ORDER — PRAVASTATIN SODIUM 40 MG
40 TABLET ORAL DAILY
Qty: 90 TAB | Refills: 3 | Status: SHIPPED | OUTPATIENT
Start: 2019-08-13 | End: 2020-10-21

## 2019-08-13 RX ORDER — METFORMIN HYDROCHLORIDE 500 MG/1
500 TABLET, EXTENDED RELEASE ORAL 2 TIMES DAILY
Qty: 180 TAB | Refills: 3 | Status: SHIPPED | OUTPATIENT
Start: 2019-08-13 | End: 2020-08-11

## 2019-08-13 RX ORDER — LISINOPRIL 5 MG/1
5 TABLET ORAL DAILY
Qty: 90 TAB | Refills: 3 | Status: SHIPPED | OUTPATIENT
Start: 2019-08-13 | End: 2020-10-21

## 2019-08-13 ASSESSMENT — PAIN SCALES - GENERAL: PAINLEVEL: NO PAIN

## 2019-08-13 NOTE — PROGRESS NOTES
Chief Complaint   Patient presents with   • Labs Only   • Other     has referral for colonoscopy-eye exam-in process         This is a 58 y.o.male patient that presents today with the following: Follow-up visit, review labs    Essential hypertension  Chronic and stable condition, well-controlled on medications including lisinopril.  Blood pressure today is 126/76 and he denies symptoms of hypertension.  Does not need refills at this time.    Hyperlipidemia  The 10-year ASCVD risk score (Madeline LIMA Jr., et al., 2013) is: 17.7%  Patient is appropriately on statin, he is on pravastatin 40 mg daily.  He will be due for labs before he follows up with me in 3 months.    Type 2 diabetes mellitus without complication, without long-term current use of insulin (HCC)  This is a chronic condition, stable and very well controlled on current medications including metformin extended release 500 mg twice daily, his A1c significantly improved since starting medication it was previously at 11.3 it is now down to 6.8%.  He has made significant dietary changes and is losing weight.  He is appropriately on ACE inhibitor as well as statin.  He is going to follow-up with me in 3 months with labs and before visit.  He would like to postpone monofilament exam and retinal scan until next visit.    Vitamin D deficiency  Patient does have history of vitamin D deficiency, last known level was 12 he has been taking over-the-counter vitamin D supplement at least 5000 units daily, previously on high-dose weekly supplement.  We will recheck a vitamin D level with next set of labs.    Fatigue  Patient requesting to have his vitamin [id number] checked as he has been feeling somewhat tired as of late.  He has had other testing to include his vitamin D which was low as well as his thyroid which was within normal limits.      Hospital Outpatient Visit on 07/20/2019   Component Date Value   • Glycohemoglobin 07/20/2019 6.8*   • Est Avg Glucose 07/20/2019 148     • Cholesterol,Tot 07/20/2019 173    • Triglycerides 07/20/2019 122    • HDL 07/20/2019 31*   • LDL 07/20/2019 118*   • Sodium 07/20/2019 138    • Potassium 07/20/2019 4.7    • Chloride 07/20/2019 104    • Co2 07/20/2019 27    • Anion Gap 07/20/2019 7.0    • Glucose 07/20/2019 125*   • Bun 07/20/2019 17    • Creatinine 07/20/2019 0.79    • Calcium 07/20/2019 9.1    • AST(SGOT) 07/20/2019 18    • ALT(SGPT) 07/20/2019 28    • Alkaline Phosphatase 07/20/2019 54    • Total Bilirubin 07/20/2019 0.4    • Albumin 07/20/2019 4.2    • Total Protein 07/20/2019 7.5    • Globulin 07/20/2019 3.3    • A-G Ratio 07/20/2019 1.3    • GFR If  07/20/2019 >60    • GFR If Non  Ameri* 07/20/2019 >60    • Creatinine, Urine 07/20/2019 61.50    • Microalbumin, Urine Rand* 07/20/2019 <0.7    • Micro Alb Creat Ratio 07/20/2019 see below          clinical course has been stable    No past medical history on file.    Past Surgical History:   Procedure Laterality Date   • EXPLORATORY LAPAROTOMY  1/6/2014    Performed by Elmer Bojorquez M.D. at [facility]       Family History   Problem Relation Age of Onset   • Cancer Mother         ovarian   • Cancer Father    • Heart Disease Paternal Grandfather    • Heart Attack Paternal Grandfather    • Diabetes Neg Hx        Patient has no known allergies.    Current Outpatient Medications Ordered in Epic   Medication Sig Dispense Refill   • Cholecalciferol (VITAMIN D3) 5000 units Tab Take  by mouth.     • metFORMIN ER (GLUCOPHAGE XR) 500 MG TABLET SR 24 HR Take 1 Tab by mouth 2 times a day. 180 Tab 3   • pravastatin (PRAVACHOL) 40 MG tablet Take 1 Tab by mouth every day. 90 Tab 3   • lisinopril (PRINIVIL) 5 MG Tab Take 1 Tab by mouth every day. 90 Tab 3   • Blood Glucose Meter Kit Test blood sugar as recommended by provider. Insurance covered blood glucose monitoring kit. 1 Kit 0   • Lancets Use one insurance covered lancet to test blood sugar twice daily. 100 Each  "0   • Blood Glucose Test Strips Use one Insurance covered strip to test blood sugar twice daily. 100 Strip 0     No current Epic-ordered facility-administered medications on file.        Constitutional ROS: No unexpected change in weight, No weakness, No unexplained fevers, sweats, or chills  Pulmonary ROS: No chronic cough, sputum, or hemoptysis, No shortness of breath, No recent change in breathing  Cardiovascular ROS: No chest pain, No edema, No palpitations, Positive for hypertension and hyperlipidemia  Gastrointestinal ROS: No abdominal pain, No nausea, vomiting, diarrhea, or constipation  Musculoskeletal/Extremities ROS: No clubbing, No peripheral edema, No pain, redness or swelling on the joints  Neurologic ROS: Normal development, No seizures, No weakness  Endocrine ROS: Positive per HPI    Physical exam:  /76 (BP Location: Left arm, Patient Position: Sitting, BP Cuff Size: Adult)   Pulse 86   Temp 37.3 °C (99.2 °F) (Temporal)   Resp 20   Ht 1.702 m (5' 7\")   Wt 105.2 kg (232 lb)   SpO2 96%   BMI 36.34 kg/m²   General Appearance: Very pleasant middle-aged male, alert, no distress, obese, well-groomed  Skin: Skin color, texture, turgor normal. No rashes or lesions.  Lungs: negative findings: normal respiratory rate and rhythm, lungs clear to auscultation  Heart: negative. RRR without murmur, gallop, or rubs.  No ectopy.  Abdomen: Abdomen soft, non-tender. BS normal. No masses,  No organomegaly  Musculoskeletal: negative findings: no evidence of joint instability, no evidence of muscle atrophy, no deformities present  Neurologic: intact, CN II through XII grossly intact    Medical decision making/discussion: We will not make changes to diabetes regimen as he has significantly improved since the last time this is measured.  He does have a referral after colonoscopy, he is yet to make an appointment.  He is going to follow-up in 3 months with labs and before visit, at that time he will be due for " monofilament exam as well as retinal scan.  He does need refills, these have been called in for him.    Driss was seen today for labs only and other.    Diagnoses and all orders for this visit:    Type 2 diabetes mellitus without complication, without long-term current use of insulin (HCC)  - HGBA1c  -     metFORMIN ER (GLUCOPHAGE XR) 500 MG TABLET SR 24 HR; Take 1 Tab by mouth 2 times a day.      Essential hypertension  -     lisinopril (PRINIVIL) 5 MG Tab; Take 1 Tab by mouth every day.    Mixed hyperlipidemia  -     pravastatin (PRAVACHOL) 40 MG tablet; Take 1 Tab by mouth every day.    Vitamin D deficiency   -     VITAMIN D,25 HYDROXY; Future  Fatigue, unspecified type  -     VITAMIN B12; Future        Return in about 3 months (around 11/13/2019) for Follow-up, Discuss Labs.        Please note that this dictation was created using voice recognition software. I have made every reasonable attempt to correct obvious errors, but I expect that there are errors of grammar and possibly content that I did not discover before finalizing the note.

## 2019-08-13 NOTE — PATIENT INSTRUCTIONS
See me in 3 months with labs    Will do retinal exam and foot exam at next appt    Will do your Tdap at next appt

## 2019-08-14 PROBLEM — R53.83 FATIGUE: Status: ACTIVE | Noted: 2019-08-14

## 2019-08-14 NOTE — ASSESSMENT & PLAN NOTE
This is a chronic condition, stable and very well controlled on current medications including metformin extended release 500 mg twice daily, his A1c significantly improved since starting medication it was previously at 11.3 it is now down to 6.8%.  He has made significant dietary changes and is losing weight.  He is appropriately on ACE inhibitor as well as statin.  He is going to follow-up with me in 3 months with labs and before visit.  He would like to postpone monofilament exam and retinal scan until next visit.

## 2019-08-14 NOTE — ASSESSMENT & PLAN NOTE
Chronic and stable condition, well-controlled on medications including lisinopril.  Blood pressure today is 126/76 and he denies symptoms of hypertension.  Does not need refills at this time.

## 2019-08-14 NOTE — ASSESSMENT & PLAN NOTE
The 10-year ASCVD risk score ([place] AMATO Jr., et al., 2013) is: 17.7%  Patient is appropriately on statin, he is on pravastatin 40 mg daily.  He will be due for labs before he follows up with me in 3 months.

## 2019-08-14 NOTE — ASSESSMENT & PLAN NOTE
Patient requesting to have his vitamin [id number] checked as he has been feeling somewhat tired as of late.  He has had other testing to include his vitamin D which was low as well as his thyroid which was within normal limits.

## 2019-08-14 NOTE — ASSESSMENT & PLAN NOTE
Patient does have history of vitamin D deficiency, last known level was 12 he has been taking over-the-counter vitamin D supplement at least 5000 units daily, previously on high-dose weekly supplement.  We will recheck a vitamin D level with next set of labs.

## 2019-11-09 ENCOUNTER — HOSPITAL ENCOUNTER (OUTPATIENT)
Dept: LAB | Facility: MEDICAL CENTER | Age: 59
End: 2019-11-09
Attending: NURSE PRACTITIONER
Payer: COMMERCIAL

## 2019-11-09 DIAGNOSIS — E55.9 VITAMIN D DEFICIENCY: ICD-10-CM

## 2019-11-09 DIAGNOSIS — R53.83 FATIGUE, UNSPECIFIED TYPE: ICD-10-CM

## 2019-11-09 DIAGNOSIS — E11.9 TYPE 2 DIABETES MELLITUS WITHOUT COMPLICATION, WITHOUT LONG-TERM CURRENT USE OF INSULIN (HCC): ICD-10-CM

## 2019-11-09 LAB
25(OH)D3 SERPL-MCNC: 37 NG/ML (ref 30–100)
EST. AVERAGE GLUCOSE BLD GHB EST-MCNC: 131 MG/DL
HBA1C MFR BLD: 6.2 % (ref 0–5.6)
VIT B12 SERPL-MCNC: 373 PG/ML (ref 211–911)

## 2019-11-09 PROCEDURE — 82607 VITAMIN B-12: CPT

## 2019-11-09 PROCEDURE — 82306 VITAMIN D 25 HYDROXY: CPT

## 2019-11-09 PROCEDURE — 83036 HEMOGLOBIN GLYCOSYLATED A1C: CPT

## 2019-11-09 PROCEDURE — 36415 COLL VENOUS BLD VENIPUNCTURE: CPT

## 2019-11-25 ENCOUNTER — OFFICE VISIT (OUTPATIENT)
Dept: MEDICAL GROUP | Facility: PHYSICIAN GROUP | Age: 59
End: 2019-11-25
Payer: COMMERCIAL

## 2019-11-25 VITALS
WEIGHT: 224 LBS | OXYGEN SATURATION: 98 % | TEMPERATURE: 96.9 F | DIASTOLIC BLOOD PRESSURE: 84 MMHG | RESPIRATION RATE: 20 BRPM | SYSTOLIC BLOOD PRESSURE: 126 MMHG | HEART RATE: 75 BPM | HEIGHT: 67 IN | BODY MASS INDEX: 35.16 KG/M2

## 2019-11-25 DIAGNOSIS — Z23 IMMUNIZATION DUE: ICD-10-CM

## 2019-11-25 DIAGNOSIS — E55.9 VITAMIN D DEFICIENCY: ICD-10-CM

## 2019-11-25 DIAGNOSIS — E78.2 MIXED HYPERLIPIDEMIA: ICD-10-CM

## 2019-11-25 DIAGNOSIS — I10 ESSENTIAL HYPERTENSION: ICD-10-CM

## 2019-11-25 DIAGNOSIS — E11.9 TYPE 2 DIABETES MELLITUS WITHOUT COMPLICATION, WITHOUT LONG-TERM CURRENT USE OF INSULIN (HCC): ICD-10-CM

## 2019-11-25 DIAGNOSIS — E66.9 OBESITY (BMI 35.0-39.9 WITHOUT COMORBIDITY): ICD-10-CM

## 2019-11-25 PROCEDURE — 90715 TDAP VACCINE 7 YRS/> IM: CPT | Performed by: NURSE PRACTITIONER

## 2019-11-25 PROCEDURE — 90732 PPSV23 VACC 2 YRS+ SUBQ/IM: CPT | Performed by: NURSE PRACTITIONER

## 2019-11-25 PROCEDURE — 90472 IMMUNIZATION ADMIN EACH ADD: CPT | Performed by: NURSE PRACTITIONER

## 2019-11-25 PROCEDURE — 90471 IMMUNIZATION ADMIN: CPT | Performed by: NURSE PRACTITIONER

## 2019-11-25 PROCEDURE — 99214 OFFICE O/P EST MOD 30 MIN: CPT | Mod: 25 | Performed by: NURSE PRACTITIONER

## 2019-11-26 NOTE — PROGRESS NOTES
Chief Complaint   Patient presents with   • Lab Results         This is a 58 y.o.male patient that presents today with the following: Follow-up, review labs    Essential hypertension  Chronic and stable, well controlled on medications, blood pressure today within normal limits and he denies symptoms of hypertension, up-to-date with labs, does not need refills at this time.  Will be due for labs again in 6 months.    Hyperlipidemia  The 10-year ASCVD risk score ([place] AMATO Jr., et al., 2013) is: 17.7%  Appropriately on statin, he takes pravastatin 40 mg daily, up-to-date with labs, will be due again in 6 months.  Does not need refills at this time, again we discussed the importance of lifestyle modifications.    Obesity (BMI 35.0-39.9 without comorbidity) ([facility])  This is chronic, stable and improving, weight is 224, this is down from 241 in May even more so since earlier in the year, he was congratulated for his weight loss and encouraged to continue with his weight loss efforts.    Type 2 diabetes mellitus without complication, without long-term current use of insulin ([facility])  This is a chronic condition, stable and very well controlled on current medications including metformin 500 mg twice daily last known A1c was 6.8%, his most recent A1c is down to 6.2%.  He has made significant dietary changes and has lost weight since his last visit, he is congratulated for this.  He is appropriately on ACE inhibitor as well as statin.  He is very interested in completely coming off metformin, I discussed with him the importance of continued weight loss efforts we will have him drop down to 500 mg once a day and recheck labs again in 6 months.  Due for monofilament exam, this is done today.    Vitamin D deficiency  She has history of vitamin D deficiency, currently takes vitamin D supplement, most recent lab was well within normal limits.      Hospital Outpatient Visit on 11/09/2019   Component Date Value   • Glycohemoglobin  11/09/2019 6.2*   • Est Avg Glucose 11/09/2019 131    • Vitamin B12 -True Cobala* 11/09/2019 373    • 25-Hydroxy   Vitamin D 25 11/09/2019 37          clinical course has been stable    No past medical history on file.    Past Surgical History:   Procedure Laterality Date   • EXPLORATORY LAPAROTOMY  1/6/2014    Performed by Elmer Bojorquez M.D. at SUR[facility]ERY [facility]       Family History   Problem Relation Age of Onset   • Cancer Mother         ovarian   • Cancer Father    • Heart Disease Paternal Grandfather    • Heart Attack Paternal Grandfather    • Diabetes Neg Hx        Patient has no known allergies.    Current Outpatient Medications Ordered in Epic   Medication Sig Dispense Refill   • Cholecalciferol (VITAMIN D3) 5000 units Tab Take  by mouth.     • metFORMIN ER (GLUCOPHAGE XR) 500 MG TABLET SR 24 HR Take 1 Tab by mouth 2 times a day. 180 Tab 3   • pravastatin (PRAVACHOL) 40 MG tablet Take 1 Tab by mouth every day. 90 Tab 3   • lisinopril (PRINIVIL) 5 MG Tab Take 1 Tab by mouth every day. 90 Tab 3   • Blood Glucose Meter Kit Test blood sugar as recommended by provider. Insurance covered blood glucose monitoring kit. 1 Kit 0   • Lancets Use one insurance covered lancet to test blood sugar twice daily. 100 Each 0   • Blood Glucose Test Strips Use one Insurance covered strip to test blood sugar twice daily. 100 Strip 0     No current Epic-ordered facility-administered medications on file.        Constitutional ROS: No unexpected change in weight, No weakness, No unexplained fevers, sweats, or chills  Pulmonary ROS: No chronic cough, sputum, or hemoptysis, No shortness of breath, No recent change in breathing  Cardiovascular ROS: No chest pain, No edema, No palpitations, Positive for hypertension and hyperlipidemia  Gastrointestinal ROS: No abdominal pain, No nausea, vomiting, diarrhea, or constipation  Musculoskeletal/Extremities ROS: No clubbing, No peripheral edema, No pain, redness or swelling on  "the joints  Neurologic ROS: Normal development, No seizures, No weakness  Endocrine ROS: Positive per HPI    Physical exam:  /84   Pulse 75   Temp 36.1 °C (96.9 °F) (Temporal)   Resp 20   Ht 1.702 m (5' 7\")   Wt 101.6 kg (224 lb)   SpO2 98%   BMI 35.08 kg/m²   General Appearance: Very pleasant middle-aged male, alert, no distress, obese, well-groomed  Skin: Skin color, texture, turgor normal. No rashes or lesions.  Lungs: negative findings: normal respiratory rate and rhythm, lungs clear to auscultation  Heart: negative. RRR without murmur, gallop, or rubs.  No ectopy.  Abdomen: Abdomen soft, non-tender. BS normal. No masses,  No organomegaly  Musculoskeletal: negative findings: no evidence of joint instability, no evidence of muscle atrophy, no deformities present  Neurologic: intact, CN II through XII grossly intact  Diabetic Foot Exam: No ulcers, erythema or skin lesions present, patient tested with monofilament (10g) and tuning fork found to be sensitive bilaterally throughout the ball of the foot, great toe and heel.      Medical decision making/discussion: Patient going to follow-up with me in 6 months with labs done before visit.  He is due for monofilament exam and retinal scan, these were done today.  He agrees to updating immunizations today including Tdap and Pneumovax, he was given written prescription for Shingrix, and adamantly declines influenza and understands the risks of doing so.  He is continue with his weight loss efforts.    Driss was seen today for lab results.    Diagnoses and all orders for this visit:    Type 2 diabetes mellitus without complication, without long-term current use of insulin (HCC)  -     Comp Metabolic Panel; Future  -     Lipid Profile; Future  -     MICROALBUMIN CREAT RATIO URINE; Future  -     POCT Retinal Eye Exam  -     Diabetic Monofilament Lower Extremity Exam    Essential hypertension  -     Comp Metabolic Panel; Future  -     Lipid Profile; Future  -     " CBC WITH DIFFERENTIAL; Future    Mixed hyperlipidemia  -     Comp Metabolic Panel; Future  -     Lipid Profile; Future    Vitamin D deficiency  -     VITAMIN D,25 HYDROXY; Future    Immunization due  -     PneumoVax PPV23 =>3yo  -     Tdap Vaccine =>8YO IM  -     Zoster Vac Recomb Adjuvanted (SHINGRIX) 50 MCG/0.5ML Recon Susp; 0.5 mL by Intramuscular route Once for 1 dose.    Obesity (BMI 35.0-39.9 without comorbidity) (HCC)        Return in about 6 months (around 5/25/2020) for Follow-up, Discuss Labs.        Please note that this dictation was created using voice recognition software. I have made every reasonable attempt to correct obvious errors, but I expect that there are errors of grammar and possibly content that I did not discover before finalizing the note.

## 2019-11-26 NOTE — ASSESSMENT & PLAN NOTE
This is a chronic condition, stable and very well controlled on current medications including metformin 500 mg twice daily last known A1c was 6.8%, his most recent A1c is down to 6.2%.  He has made significant dietary changes and has lost weight since his last visit, he is congratulated for this.  He is appropriately on ACE inhibitor as well as statin.  He is very interested in completely coming off metformin, I discussed with him the importance of continued weight loss efforts we will have him drop down to 500 mg once a day and recheck labs again in 6 months.  Due for monofilament exam, this is done today.

## 2019-11-26 NOTE — ASSESSMENT & PLAN NOTE
This is chronic, stable and improving, weight is 224, this is down from 241 in May even more so since earlier in the year, he was congratulated for his weight loss and encouraged to continue with his weight loss efforts.

## 2019-11-26 NOTE — ASSESSMENT & PLAN NOTE
She has history of vitamin D deficiency, currently takes vitamin D supplement, most recent lab was well within normal limits.

## 2019-11-26 NOTE — PATIENT INSTRUCTIONS
[facility]   [address] [place] [id number]  Phone: [phone]    Labs before you see me in 6 months    Keep up the good work

## 2019-11-26 NOTE — ASSESSMENT & PLAN NOTE
The 10-year ASCVD risk score (Madeline AMATO Jr., et al., 2013) is: 17.7%  Appropriately on statin, he takes pravastatin 40 mg daily, up-to-date with labs, will be due again in 6 months.  Does not need refills at this time, again we discussed the importance of lifestyle modifications.

## 2019-11-26 NOTE — ASSESSMENT & PLAN NOTE
Chronic and stable, well controlled on medications, blood pressure today within normal limits and he denies symptoms of hypertension, up-to-date with labs, does not need refills at this time.  Will be due for labs again in 6 months.

## 2020-04-23 ENCOUNTER — OFFICE VISIT (OUTPATIENT)
Dept: MEDICAL GROUP | Facility: PHYSICIAN GROUP | Age: 60
End: 2020-04-23
Payer: COMMERCIAL

## 2020-04-23 VITALS
DIASTOLIC BLOOD PRESSURE: 86 MMHG | HEART RATE: 81 BPM | SYSTOLIC BLOOD PRESSURE: 142 MMHG | HEIGHT: 67 IN | OXYGEN SATURATION: 97 % | RESPIRATION RATE: 16 BRPM | WEIGHT: 233 LBS | TEMPERATURE: 98.5 F | BODY MASS INDEX: 36.57 KG/M2

## 2020-04-23 DIAGNOSIS — R22.0 LIP SWELLING: ICD-10-CM

## 2020-04-23 PROCEDURE — 99214 OFFICE O/P EST MOD 30 MIN: CPT | Performed by: NURSE PRACTITIONER

## 2020-04-23 RX ORDER — CLOBETASOL PROPIONATE 0.5 MG/G
OINTMENT TOPICAL
Qty: 15 G | Refills: 0 | Status: SHIPPED
Start: 2020-04-23 | End: 2020-04-23

## 2020-04-23 RX ORDER — TRIAMCINOLONE ACETONIDE 1 MG/G
OINTMENT TOPICAL
Qty: 15 G | Refills: 0 | Status: SHIPPED | OUTPATIENT
Start: 2020-04-23

## 2020-04-23 ASSESSMENT — PATIENT HEALTH QUESTIONNAIRE - PHQ9: CLINICAL INTERPRETATION OF PHQ2 SCORE: 0

## 2020-04-23 NOTE — PROGRESS NOTES
Chief Complaint   Patient presents with   • Lip Swelling     x 2 months          This is a 59 y.o.male patient that presents today with the following:    Lip swelling  Patient has developed lip swelling and chapped lips over the past several weeks.  He states he has tried everything over-the-counter including lip balm, Chapstick and medicated lip balm.  He has not started any new medications, new foods or personal hygiene products.  He does not recall being out in the sun where he suffered sunburn on his lips.  He is requesting a referral to dermatology as he is concerned because of the bruising on The inside of his lips.He has not tried over-the-counter hydrocortisone cream, we will call in a low potency steroid cream and have placed referral to dermatology.  He was advised to stay adequately hydrated.  He is to follow-up with me if symptoms do not improve over the next 1 to 2 weeks.        Lip swelling  Patient has developed lip swelling and chapped lips over the past several weeks.  He states he has tried everything over-the-counter including lip balm, Chapstick and medicated lip balm.  He has not started any new medications, new foods or personal hygiene products.  He does not recall being out in the sun where he suffered sunburn on his lips.  He is requesting a referral to dermatology as he is concerned because of the bruising on The inside of his lips.He has not tried over-the-counter hydrocortisone cream, we will call in a low potency steroid cream and have placed referral to dermatology.  He was advised to stay adequately hydrated.  He is to follow-up with me if symptoms do not improve over the next 1 to 2 weeks.      No visits with results within 1 Month(s) from this visit.   Latest known visit with results is:   Hospital Outpatient Visit on 11/09/2019   Component Date Value   • Glycohemoglobin 11/09/2019 6.2*   • Est Avg Glucose 11/09/2019 131    • Vitamin B12 -True Cobala* 11/09/2019 373    • 25-Hydroxy    "Vitamin D 25 11/09/2019 37          clinical course has been stable    No past medical history on file.    Past Surgical History:   Procedure Laterality Date   • EXPLORATORY LAPAROTOMY  1/6/2014    Performed by Elmer Bojorquez M.D. at SURGERY [facility]       Family History   Problem Relation Age of Onset   • Cancer Mother         ovarian   • Cancer Father    • Heart Disease Paternal Grandfather    • Heart Attack Paternal Grandfather    • Diabetes Neg Hx        Patient has no known allergies.    Current Outpatient Medications Ordered in Epic   Medication Sig Dispense Refill   • triamcinolone acetonide (KENALOG) 0.1 % Ointment Apply to affected area twice a day for 1 week 15 g 0   • Cholecalciferol (VITAMIN D3) 5000 units Tab Take  by mouth.     • metFORMIN ER (GLUCOPHAGE XR) 500 MG TABLET SR 24 HR Take 1 Tab by mouth 2 times a day. 180 Tab 3   • pravastatin (PRAVACHOL) 40 MG tablet Take 1 Tab by mouth every day. 90 Tab 3   • lisinopril (PRINIVIL) 5 MG Tab Take 1 Tab by mouth every day. 90 Tab 3   • Blood Glucose Meter Kit Test blood sugar as recommended by provider. Insurance covered blood glucose monitoring kit. 1 Kit 0   • Lancets Use one insurance covered lancet to test blood sugar twice daily. 100 Each 0   • Blood Glucose Test Strips Use one Insurance covered strip to test blood sugar twice daily. 100 Strip 0     No current Epic-ordered facility-administered medications on file.        Constitutional ROS: No unexpected change in weight, No weakness, No unexplained fevers, sweats, or chills  Pulmonary ROS: No chronic cough, sputum, or hemoptysis, No shortness of breath, No recent change in breathing  Cardiovascular ROS: No chest pain  Musculoskeletal/Extremities ROS: No clubbing  Skin/Integumentary ROS: Positive per HPI  Neurologic ROS: Normal development, No seizures, No weakness    Physical exam:  /86   Pulse 81   Temp 36.9 °C (98.5 °F) (Temporal)   Resp 16   Ht 1.702 m (5' 7\")   Wt 105.7 " kg (233 lb)   SpO2 97%   BMI 36.49 kg/m²   General Appearance: Very pleasant middle-age male,alert, no distress, Obese, well-groomed  Skin: Positive for dry cracked upper and lower lips with bruising  Lungs: negative findings: normal respiratory rate and rhythm, Normal effort  Abdomen: Abdomen soft, non-tender. BS normal. No masses,  No organomegaly  Musculoskeletal: negative findings: no evidence of joint instability, no evidence of muscle atrophy, no deformities present  Neurologic: intact, CN II through XII grossly intact    Medical decision making/discussion:     Referral to derm    Steroid twice a day for 1 week    Consider Aquafor medicated lip ointment    Driss was seen today for lip swelling.    Diagnoses and all orders for this visit:    Lip swelling  -     REFERRAL TO DERMATOLOGY  -     Discontinue: clobetasol (TEMOVATE) 0.05 % Ointment; Apply very thin layer to affected area  Twice a day for 1 week  -     triamcinolone acetonide (KENALOG) 0.1 % Ointment; Apply to affected area twice a day for 1 week        No follow-ups on file.        Please note that this dictation was created using voice recognition software. I have made every reasonable attempt to correct obvious errors, but I expect that there are errors of grammar and possibly content that I did not discover before finalizing the note.

## 2020-04-27 NOTE — ASSESSMENT & PLAN NOTE
Patient has developed lip swelling and chapped lips over the past several weeks.  He states he has tried everything over-the-counter including lip balm, Chapstick and medicated lip balm.  He has not started any new medications, new foods or personal hygiene products.  He does not recall being out in the sun where he suffered sunburn on his lips.  He is requesting a referral to dermatology as he is concerned because of the bruising on The inside of his lips.He has not tried over-the-counter hydrocortisone cream, we will call in a low potency steroid cream and have placed referral to dermatology.  He was advised to stay adequately hydrated.  He is to follow-up with me if symptoms do not improve over the next 1 to 2 weeks.

## 2020-07-20 ENCOUNTER — NON-PROVIDER VISIT (OUTPATIENT)
Dept: URGENT CARE | Facility: PHYSICIAN GROUP | Age: 60
End: 2020-07-20

## 2020-07-20 PROCEDURE — 80305 DRUG TEST PRSMV DIR OPT OBS: CPT | Performed by: PHYSICIAN ASSISTANT
